# Patient Record
Sex: FEMALE | Race: WHITE | Employment: OTHER | URBAN - NONMETROPOLITAN AREA
[De-identification: names, ages, dates, MRNs, and addresses within clinical notes are randomized per-mention and may not be internally consistent; named-entity substitution may affect disease eponyms.]

---

## 2019-04-10 LAB
ALBUMIN SERPL-MCNC: NORMAL G/DL
ALP BLD-CCNC: NORMAL U/L
ALT SERPL-CCNC: NORMAL U/L
ANION GAP SERPL CALCULATED.3IONS-SCNC: NORMAL MMOL/L
AST SERPL-CCNC: NORMAL U/L
BILIRUB SERPL-MCNC: NORMAL MG/DL (ref 0.1–1.4)
BUN BLDV-MCNC: NORMAL MG/DL
CALCIUM SERPL-MCNC: NORMAL MG/DL
CHLORIDE BLD-SCNC: NORMAL MMOL/L
CO2: NORMAL MMOL/L
CREAT SERPL-MCNC: NORMAL MG/DL
GFR CALCULATED: NORMAL
GLUCOSE BLD-MCNC: NORMAL MG/DL
POTASSIUM SERPL-SCNC: NORMAL MMOL/L
SODIUM BLD-SCNC: NORMAL MMOL/L
TOTAL PROTEIN: NORMAL

## 2019-05-09 ENCOUNTER — OFFICE VISIT (OUTPATIENT)
Dept: FAMILY MEDICINE CLINIC | Age: 79
End: 2019-05-09
Payer: COMMERCIAL

## 2019-05-09 VITALS
DIASTOLIC BLOOD PRESSURE: 76 MMHG | BODY MASS INDEX: 21.58 KG/M2 | SYSTOLIC BLOOD PRESSURE: 132 MMHG | OXYGEN SATURATION: 93 % | HEIGHT: 64 IN | TEMPERATURE: 97.3 F | HEART RATE: 113 BPM | WEIGHT: 126.4 LBS

## 2019-05-09 DIAGNOSIS — J44.1 CHRONIC OBSTRUCTIVE PULMONARY DISEASE WITH ACUTE EXACERBATION (HCC): ICD-10-CM

## 2019-05-09 PROBLEM — J44.9 COPD (CHRONIC OBSTRUCTIVE PULMONARY DISEASE) (HCC): Status: ACTIVE | Noted: 2019-05-09

## 2019-05-09 PROCEDURE — 99213 OFFICE O/P EST LOW 20 MIN: CPT | Performed by: NURSE PRACTITIONER

## 2019-05-09 RX ORDER — PANTOPRAZOLE SODIUM 40 MG/1
40 TABLET, DELAYED RELEASE ORAL DAILY
COMMUNITY
End: 2019-07-22 | Stop reason: SDUPTHER

## 2019-05-09 RX ORDER — ALPRAZOLAM 0.25 MG/1
0.25 TABLET ORAL DAILY
COMMUNITY
End: 2019-07-22 | Stop reason: SDUPTHER

## 2019-05-09 RX ORDER — IPRATROPIUM BROMIDE AND ALBUTEROL SULFATE 2.5; .5 MG/3ML; MG/3ML
1 SOLUTION RESPIRATORY (INHALATION) EVERY 6 HOURS PRN
COMMUNITY
End: 2019-11-25 | Stop reason: SDUPTHER

## 2019-05-09 RX ORDER — ALBUTEROL SULFATE 90 UG/1
2 AEROSOL, METERED RESPIRATORY (INHALATION)
COMMUNITY
End: 2019-06-25 | Stop reason: SDUPTHER

## 2019-05-09 RX ORDER — METOPROLOL SUCCINATE 50 MG/1
50 TABLET, EXTENDED RELEASE ORAL DAILY
COMMUNITY
End: 2019-07-22 | Stop reason: SDUPTHER

## 2019-05-09 NOTE — PROGRESS NOTES
2019     Celina Ríos (:  1940) is a 77 y.o. female, here for evaluation of the following medical concerns:  Chief Complaint   Patient presents with    Shortness of Breath     weak, shakey        HPI:  77 y.o. female presents with severe SOB. Pulse ox on 3L was 75 with movement. Never above 90 sitting still and not talking. HR is 120. Daughter mentions she is also anemic. Was in hospital a month ago. Has not had f/u with pulmonary. Heart to heart with patient and daughter. To proceed to ED     No past medical history on file. Current Outpatient Medications on File Prior to Visit   Medication Sig Dispense Refill    traMADol (ULTRAM) 50 MG tablet Take 50 mg by mouth 3 times daily as needed. No current facility-administered medications on file prior to visit. Allergies   Allergen Reactions    Penicillins     Venlafaxine        No family history on file. Past Surgical History:   Procedure Laterality Date    KNEE SURGERY         Social History     Tobacco Use    Smoking status: Former Smoker     Packs/day: 0.50     Years: 60.00     Pack years: 30.00     Types: Cigarettes     Last attempt to quit: 12/10/2018     Years since quittin.4    Smokeless tobacco: Never Used   Substance Use Topics    Alcohol use: No    Drug use: Not on file       ROS:      Review of Systems            Vitals:    19 0936   BP: 132/76   Pulse: 113   Temp: 97.3 °F (36.3 °C)   SpO2: 93%  Comment: 3lpm nc   Weight: 126 lb 6.4 oz (57.3 kg)   Height: 5' 3.5\" (1.613 m)     Estimated body mass index is 22.04 kg/m² as calculated from the following:    Height as of this encounter: 5' 3.5\" (1.613 m). Weight as of this encounter: 126 lb 6.4 oz (57.3 kg). PHYSICAL EXAM:    VS:  Blood pressure 132/76, pulse 113, temperature 97.3 °F (36.3 °C), height 5' 3.5\" (1.613 m), weight 126 lb 6.4 oz (57.3 kg), SpO2 93 %. Physical Exam   Constitutional:   Chronically ill and frail.  O2 NC   Cardiovascular: Tachycardia present. Pulmonary/Chest: Accessory muscle usage present. Tachypnea noted. She is in respiratory distress. She has decreased breath sounds. She has rales. ASSESSMENT/PLAN:    Cherry Seays was seen today for shortness of breath. Diagnoses and all orders for this visit:    Chronic obstructive pulmonary disease with acute exacerbation ([facility] [address]         No orders of the defined types were placed in this encounter. No follow-ups on file. An electronic signature was used to authenticate this note.     --MIKE Huffman - CNP on 5/9/2019 at 10:35 AM

## 2019-06-25 DIAGNOSIS — M15.9 PRIMARY OSTEOARTHRITIS INVOLVING MULTIPLE JOINTS: ICD-10-CM

## 2019-06-25 DIAGNOSIS — J43.2 CENTRILOBULAR EMPHYSEMA (HCC): Primary | ICD-10-CM

## 2019-06-25 NOTE — TELEPHONE ENCOUNTER
She is asking for a refill on albuterol and tramadol.   Please send to [facility] in [place]

## 2019-06-27 RX ORDER — TRAMADOL HYDROCHLORIDE 50 MG/1
50 TABLET ORAL 3 TIMES DAILY PRN
Qty: 90 TABLET | Refills: 0 | Status: SHIPPED | OUTPATIENT
Start: 2019-06-27 | End: 2019-07-22 | Stop reason: SDUPTHER

## 2019-06-27 RX ORDER — ALBUTEROL SULFATE 90 UG/1
2 AEROSOL, METERED RESPIRATORY (INHALATION)
Qty: 1 INHALER | Refills: 5 | Status: SHIPPED | OUTPATIENT
Start: 2019-06-27 | End: 2019-07-22 | Stop reason: SDUPTHER

## 2019-07-04 RX ORDER — ESTRADIOL 0.05 MG/D
1 FILM, EXTENDED RELEASE TRANSDERMAL SEE ADMIN INSTRUCTIONS
Qty: 4 PATCH | Refills: 0 | Status: SHIPPED | OUTPATIENT
Start: 2019-07-04 | End: 2019-07-22 | Stop reason: SDUPTHER

## 2019-07-21 ENCOUNTER — CLINICAL DOCUMENTATION (OUTPATIENT)
Dept: PRIMARY CARE CLINIC | Age: 79
End: 2019-07-21

## 2019-07-22 ENCOUNTER — OFFICE VISIT (OUTPATIENT)
Dept: PRIMARY CARE CLINIC | Age: 79
End: 2019-07-22
Payer: COMMERCIAL

## 2019-07-22 VITALS
HEIGHT: 63 IN | BODY MASS INDEX: 23.04 KG/M2 | HEART RATE: 82 BPM | DIASTOLIC BLOOD PRESSURE: 70 MMHG | SYSTOLIC BLOOD PRESSURE: 118 MMHG | WEIGHT: 130 LBS | OXYGEN SATURATION: 91 %

## 2019-07-22 DIAGNOSIS — F41.9 ANXIETY: ICD-10-CM

## 2019-07-22 DIAGNOSIS — J43.2 CENTRILOBULAR EMPHYSEMA (HCC): Primary | ICD-10-CM

## 2019-07-22 DIAGNOSIS — I10 ESSENTIAL HYPERTENSION: ICD-10-CM

## 2019-07-22 DIAGNOSIS — K21.9 GASTROESOPHAGEAL REFLUX DISEASE WITHOUT ESOPHAGITIS: ICD-10-CM

## 2019-07-22 DIAGNOSIS — Z78.0 MENOPAUSE: ICD-10-CM

## 2019-07-22 DIAGNOSIS — M15.9 PRIMARY OSTEOARTHRITIS INVOLVING MULTIPLE JOINTS: ICD-10-CM

## 2019-07-22 PROCEDURE — 99214 OFFICE O/P EST MOD 30 MIN: CPT | Performed by: INTERNAL MEDICINE

## 2019-07-22 RX ORDER — PANTOPRAZOLE SODIUM 40 MG/1
40 TABLET, DELAYED RELEASE ORAL DAILY
Qty: 30 TABLET | Refills: 2 | Status: SHIPPED | OUTPATIENT
Start: 2019-07-22 | End: 2019-11-25 | Stop reason: CLARIF

## 2019-07-22 RX ORDER — METOPROLOL SUCCINATE 50 MG/1
50 TABLET, EXTENDED RELEASE ORAL DAILY
Qty: 30 TABLET | Refills: 2 | Status: SHIPPED | OUTPATIENT
Start: 2019-07-22 | End: 2019-10-19 | Stop reason: SDUPTHER

## 2019-07-22 RX ORDER — ALBUTEROL SULFATE 90 UG/1
2 AEROSOL, METERED RESPIRATORY (INHALATION)
Qty: 1 INHALER | Refills: 5 | Status: SHIPPED | OUTPATIENT
Start: 2019-07-22 | End: 2019-11-25 | Stop reason: SDUPTHER

## 2019-07-22 RX ORDER — ESTRADIOL 0.05 MG/D
1 FILM, EXTENDED RELEASE TRANSDERMAL SEE ADMIN INSTRUCTIONS
Qty: 4 PATCH | Refills: 2 | Status: SHIPPED | OUTPATIENT
Start: 2019-07-22 | End: 2019-11-25 | Stop reason: SDUPTHER

## 2019-07-22 RX ORDER — ALPRAZOLAM 0.25 MG/1
0.25 TABLET ORAL DAILY
Qty: 30 TABLET | Refills: 2 | Status: SHIPPED | OUTPATIENT
Start: 2019-07-22 | End: 2019-10-20

## 2019-07-22 RX ORDER — TRAMADOL HYDROCHLORIDE 50 MG/1
50 TABLET ORAL 3 TIMES DAILY PRN
Qty: 90 TABLET | Refills: 2 | Status: SHIPPED | OUTPATIENT
Start: 2019-07-22 | End: 2019-07-24 | Stop reason: SDUPTHER

## 2019-07-24 DIAGNOSIS — M15.9 PRIMARY OSTEOARTHRITIS INVOLVING MULTIPLE JOINTS: ICD-10-CM

## 2019-07-24 RX ORDER — TRAMADOL HYDROCHLORIDE 50 MG/1
50 TABLET ORAL 3 TIMES DAILY PRN
Qty: 90 TABLET | Refills: 2 | Status: SHIPPED | OUTPATIENT
Start: 2019-07-24 | End: 2019-10-22

## 2019-07-24 NOTE — TELEPHONE ENCOUNTER
Pt called in and stated that med never got sent to pharm but it looks like it was sent. I called [facility] and they never received it.

## 2019-08-28 ENCOUNTER — TELEPHONE (OUTPATIENT)
Dept: PRIMARY CARE CLINIC | Age: 79
End: 2019-08-28

## 2019-08-28 DIAGNOSIS — E61.1 IRON DEFICIENCY: ICD-10-CM

## 2019-08-28 DIAGNOSIS — J43.2 CENTRILOBULAR EMPHYSEMA (HCC): Primary | ICD-10-CM

## 2019-10-19 DIAGNOSIS — I10 ESSENTIAL HYPERTENSION: ICD-10-CM

## 2019-10-21 RX ORDER — METOPROLOL SUCCINATE 50 MG/1
TABLET, EXTENDED RELEASE ORAL
Qty: 90 TABLET | Refills: 0 | Status: SHIPPED | OUTPATIENT
Start: 2019-10-21 | End: 2019-11-25 | Stop reason: SDUPTHER

## 2019-11-01 DIAGNOSIS — M15.9 PRIMARY OSTEOARTHRITIS INVOLVING MULTIPLE JOINTS: ICD-10-CM

## 2019-11-01 RX ORDER — TRAMADOL HYDROCHLORIDE 50 MG/1
50 TABLET ORAL 3 TIMES DAILY PRN
Qty: 90 TABLET | Refills: 0 | Status: SHIPPED | OUTPATIENT
Start: 2019-11-01 | End: 2019-11-25 | Stop reason: SDUPTHER

## 2019-11-05 RX ORDER — ALPRAZOLAM 0.25 MG/1
0.25 TABLET ORAL NIGHTLY PRN
COMMUNITY
End: 2019-11-25 | Stop reason: SDUPTHER

## 2019-11-25 ENCOUNTER — OFFICE VISIT (OUTPATIENT)
Dept: PRIMARY CARE CLINIC | Age: 79
End: 2019-11-25
Payer: COMMERCIAL

## 2019-11-25 ENCOUNTER — HOSPITAL ENCOUNTER (OUTPATIENT)
Age: 79
Discharge: HOME | End: 2019-11-27
Payer: COMMERCIAL

## 2019-11-25 VITALS
SYSTOLIC BLOOD PRESSURE: 118 MMHG | DIASTOLIC BLOOD PRESSURE: 68 MMHG | BODY MASS INDEX: 24.27 KG/M2 | WEIGHT: 137 LBS | OXYGEN SATURATION: 97 % | HEART RATE: 69 BPM

## 2019-11-25 DIAGNOSIS — D64.9 ANEMIA, UNSPECIFIED TYPE: Primary | ICD-10-CM

## 2019-11-25 DIAGNOSIS — J43.2 CENTRILOBULAR EMPHYSEMA (HCC): ICD-10-CM

## 2019-11-25 DIAGNOSIS — M70.61 GREATER TROCHANTERIC BURSITIS OF BOTH HIPS: ICD-10-CM

## 2019-11-25 DIAGNOSIS — Z78.0 MENOPAUSE: ICD-10-CM

## 2019-11-25 DIAGNOSIS — I10 ESSENTIAL HYPERTENSION: ICD-10-CM

## 2019-11-25 DIAGNOSIS — M70.62 GREATER TROCHANTERIC BURSITIS OF BOTH HIPS: ICD-10-CM

## 2019-11-25 DIAGNOSIS — M15.9 PRIMARY OSTEOARTHRITIS INVOLVING MULTIPLE JOINTS: ICD-10-CM

## 2019-11-25 DIAGNOSIS — D64.9 ANEMIA, UNSPECIFIED TYPE: ICD-10-CM

## 2019-11-25 LAB
ALBUMIN SERPL-MCNC: 4.4 G/DL (ref 3.5–5.2)
ALP BLD-CCNC: 95 U/L (ref 35–104)
ALT SERPL-CCNC: 17 U/L (ref 0–32)
ANION GAP SERPL CALCULATED.3IONS-SCNC: 14 MMOL/L (ref 7–16)
ANISOCYTOSIS: ABNORMAL
AST SERPL-CCNC: 22 U/L (ref 0–31)
BASOPHILS ABSOLUTE: 0.08 E9/L (ref 0–0.2)
BASOPHILS RELATIVE PERCENT: 0.9 % (ref 0–2)
BILIRUB SERPL-MCNC: 0.2 MG/DL (ref 0–1.2)
BUN BLDV-MCNC: 21 MG/DL (ref 8–23)
CALCIUM SERPL-MCNC: 9.9 MG/DL (ref 8.6–10.2)
CHLORIDE BLD-SCNC: 98 MMOL/L (ref 98–107)
CO2: 26 MMOL/L (ref 22–29)
CREAT SERPL-MCNC: 0.7 MG/DL (ref 0.5–1)
EOSINOPHILS ABSOLUTE: 0.24 E9/L (ref 0.05–0.5)
EOSINOPHILS RELATIVE PERCENT: 2.8 % (ref 0–6)
FERRITIN: 17 NG/ML
GFR AFRICAN AMERICAN: >60
GFR NON-AFRICAN AMERICAN: >60 ML/MIN/1.73
GLUCOSE BLD-MCNC: 82 MG/DL (ref 74–99)
HCT VFR BLD CALC: 36.9 % (ref 34–48)
HEMOGLOBIN: 10.6 G/DL (ref 11.5–15.5)
IMMATURE GRANULOCYTES #: 0.03 E9/L
IMMATURE GRANULOCYTES %: 0.3 % (ref 0–5)
IRON SATURATION: 7 % (ref 15–50)
IRON: 31 MCG/DL (ref 37–145)
LYMPHOCYTES ABSOLUTE: 2.4 E9/L (ref 1.5–4)
LYMPHOCYTES RELATIVE PERCENT: 27.8 % (ref 20–42)
MAGNESIUM: 2 MG/DL (ref 1.6–2.6)
MCH RBC QN AUTO: 23.2 PG (ref 26–35)
MCHC RBC AUTO-ENTMCNC: 28.7 % (ref 32–34.5)
MCV RBC AUTO: 80.9 FL (ref 80–99.9)
MONOCYTES ABSOLUTE: 0.94 E9/L (ref 0.1–0.95)
MONOCYTES RELATIVE PERCENT: 10.9 % (ref 2–12)
NEUTROPHILS ABSOLUTE: 4.95 E9/L (ref 1.8–7.3)
NEUTROPHILS RELATIVE PERCENT: 57.3 % (ref 43–80)
OVALOCYTES: ABNORMAL
PDW BLD-RTO: 18.6 FL (ref 11.5–15)
PLATELET # BLD: 323 E9/L (ref 130–450)
PMV BLD AUTO: 9.7 FL (ref 7–12)
POIKILOCYTES: ABNORMAL
POTASSIUM SERPL-SCNC: 4.5 MMOL/L (ref 3.5–5)
RBC # BLD: 4.56 E12/L (ref 3.5–5.5)
SODIUM BLD-SCNC: 138 MMOL/L (ref 132–146)
TOTAL IRON BINDING CAPACITY: 461 MCG/DL (ref 250–450)
TOTAL PROTEIN: 7.8 G/DL (ref 6.4–8.3)
WBC # BLD: 8.6 E9/L (ref 4.5–11.5)

## 2019-11-25 PROCEDURE — 83540 ASSAY OF IRON: CPT

## 2019-11-25 PROCEDURE — 99214 OFFICE O/P EST MOD 30 MIN: CPT | Performed by: INTERNAL MEDICINE

## 2019-11-25 PROCEDURE — 83735 ASSAY OF MAGNESIUM: CPT

## 2019-11-25 PROCEDURE — 80053 COMPREHEN METABOLIC PANEL: CPT

## 2019-11-25 PROCEDURE — 85025 COMPLETE CBC W/AUTO DIFF WBC: CPT

## 2019-11-25 PROCEDURE — 36415 COLL VENOUS BLD VENIPUNCTURE: CPT

## 2019-11-25 PROCEDURE — 83550 IRON BINDING TEST: CPT

## 2019-11-25 PROCEDURE — 82728 ASSAY OF FERRITIN: CPT

## 2019-11-25 RX ORDER — METOPROLOL SUCCINATE 50 MG/1
TABLET, EXTENDED RELEASE ORAL
Qty: 90 TABLET | Refills: 1 | Status: SHIPPED
Start: 2019-11-25 | End: 2020-03-16 | Stop reason: SDUPTHER

## 2019-11-25 RX ORDER — ESTRADIOL 0.05 MG/D
1 FILM, EXTENDED RELEASE TRANSDERMAL SEE ADMIN INSTRUCTIONS
Qty: 4 PATCH | Refills: 2 | Status: SHIPPED
Start: 2019-11-25 | End: 2020-03-16 | Stop reason: SDUPTHER

## 2019-11-25 RX ORDER — ALPRAZOLAM 0.25 MG/1
0.25 TABLET ORAL NIGHTLY PRN
Qty: 30 TABLET | Refills: 2 | Status: SHIPPED | OUTPATIENT
Start: 2019-11-25 | End: 2020-02-23

## 2019-11-25 RX ORDER — TRAMADOL HYDROCHLORIDE 50 MG/1
50 TABLET ORAL 3 TIMES DAILY PRN
Qty: 90 TABLET | Refills: 1 | Status: SHIPPED | OUTPATIENT
Start: 2019-11-25 | End: 2020-01-30 | Stop reason: SDUPTHER

## 2019-11-25 RX ORDER — IPRATROPIUM BROMIDE AND ALBUTEROL SULFATE 2.5; .5 MG/3ML; MG/3ML
1 SOLUTION RESPIRATORY (INHALATION) EVERY 6 HOURS PRN
Qty: 360 ML | Refills: 5 | Status: SHIPPED
Start: 2019-11-25 | End: 2020-03-16 | Stop reason: SDUPTHER

## 2019-11-25 RX ORDER — ALBUTEROL SULFATE 90 UG/1
2 AEROSOL, METERED RESPIRATORY (INHALATION)
Qty: 1 INHALER | Refills: 5 | Status: SHIPPED
Start: 2019-11-25 | End: 2020-03-16 | Stop reason: SDUPTHER

## 2019-11-26 DIAGNOSIS — D50.9 IRON DEFICIENCY ANEMIA, UNSPECIFIED IRON DEFICIENCY ANEMIA TYPE: Primary | ICD-10-CM

## 2019-12-07 ENCOUNTER — OFFICE VISIT (OUTPATIENT)
Dept: FAMILY MEDICINE CLINIC | Age: 79
End: 2019-12-07
Payer: COMMERCIAL

## 2019-12-07 VITALS
DIASTOLIC BLOOD PRESSURE: 62 MMHG | SYSTOLIC BLOOD PRESSURE: 124 MMHG | TEMPERATURE: 98 F | OXYGEN SATURATION: 90 % | HEART RATE: 116 BPM

## 2019-12-07 DIAGNOSIS — J44.1 CHRONIC OBSTRUCTIVE PULMONARY DISEASE WITH ACUTE EXACERBATION (HCC): Primary | ICD-10-CM

## 2019-12-07 DIAGNOSIS — R06.2 WHEEZE: ICD-10-CM

## 2019-12-07 DIAGNOSIS — J18.9 PNEUMONIA DUE TO INFECTIOUS ORGANISM, UNSPECIFIED LATERALITY, UNSPECIFIED PART OF LUNG: ICD-10-CM

## 2019-12-07 DIAGNOSIS — R06.02 SOB (SHORTNESS OF BREATH): ICD-10-CM

## 2019-12-07 PROCEDURE — 99214 OFFICE O/P EST MOD 30 MIN: CPT | Performed by: INTERNAL MEDICINE

## 2019-12-07 PROCEDURE — S0077 INJECTION, CLINDAMYCIN PHOSP: HCPCS | Performed by: INTERNAL MEDICINE

## 2019-12-07 RX ORDER — PREDNISONE 10 MG/1
TABLET ORAL
Qty: 18 TABLET | Refills: 0 | Status: SHIPPED | OUTPATIENT
Start: 2019-12-07 | End: 2019-12-16

## 2019-12-07 RX ORDER — DOXYCYCLINE HYCLATE 100 MG
100 TABLET ORAL 2 TIMES DAILY
Qty: 20 TABLET | Refills: 0 | Status: SHIPPED | OUTPATIENT
Start: 2019-12-07 | End: 2019-12-17

## 2019-12-07 RX ORDER — CLINDAMYCIN PHOSPHATE 150 MG/ML
300 INJECTION, SOLUTION INTRAVENOUS ONCE
Status: COMPLETED | OUTPATIENT
Start: 2019-12-07 | End: 2019-12-07

## 2019-12-07 RX ADMIN — CLINDAMYCIN PHOSPHATE 300 MG: 150 INJECTION, SOLUTION INTRAVENOUS at 12:37

## 2019-12-07 ASSESSMENT — ENCOUNTER SYMPTOMS
SINUS PAIN: 0
EYES NEGATIVE: 1
NAUSEA: 0
ABDOMINAL PAIN: 0
SHORTNESS OF BREATH: 1
COUGH: 1
WHEEZING: 1
CHEST TIGHTNESS: 0
SORE THROAT: 0
SINUS PRESSURE: 0

## 2020-01-13 ENCOUNTER — OFFICE VISIT (OUTPATIENT)
Dept: PRIMARY CARE CLINIC | Age: 80
End: 2020-01-13
Payer: COMMERCIAL

## 2020-01-13 VITALS
HEART RATE: 76 BPM | HEIGHT: 64 IN | SYSTOLIC BLOOD PRESSURE: 134 MMHG | TEMPERATURE: 97.3 F | DIASTOLIC BLOOD PRESSURE: 68 MMHG | WEIGHT: 136 LBS | OXYGEN SATURATION: 92 % | BODY MASS INDEX: 23.22 KG/M2

## 2020-01-13 PROCEDURE — 99214 OFFICE O/P EST MOD 30 MIN: CPT | Performed by: NURSE PRACTITIONER

## 2020-01-13 PROCEDURE — 1111F DSCHRG MED/CURRENT MED MERGE: CPT | Performed by: NURSE PRACTITIONER

## 2020-01-13 ASSESSMENT — PATIENT HEALTH QUESTIONNAIRE - PHQ9
SUM OF ALL RESPONSES TO PHQ QUESTIONS 1-9: 0
2. FEELING DOWN, DEPRESSED OR HOPELESS: 0
1. LITTLE INTEREST OR PLEASURE IN DOING THINGS: 0
SUM OF ALL RESPONSES TO PHQ QUESTIONS 1-9: 0
SUM OF ALL RESPONSES TO PHQ9 QUESTIONS 1 & 2: 0

## 2020-01-13 NOTE — PROGRESS NOTES
CC: The patient presents to follow-up from hospitalization. HPI: The patient was hospitalized with hemoptysis and diagnosed with right-sided pneumonia. During hospitalization, she was noted to be hypoxic and required 4 L of oxygen. She is now currently on 3 L, saturation 92-93 here in the office. She states she does not monitor this at home. On imaging of the chest, she did have a 2.5 cm right hilar node that was increased from imaging completed in early December. She is following with pulmonology, and will have repeat imaging next month. The patient did have bronchoscopy completed while she was in the hospital, no mass was visualized. Clinically, the patient is improved. Prior to hospitalization, the patient was recommended to see hematology for IV iron infusions. She has been trying to take oral iron but has not tolerated this. I did discuss with her the correlation between anemia and oxygen saturations, and she states she is willing to make the phone call and get into see hematology. ROS:  Const: General health stated as fair. Eyes: Denies eye symptoms. CV: Reports hypertension. Resp: Reports COPD, chronic hypoxemia. Recent pneumonia. Right hilar mass. : Severe hot flushing Denies urinary problems. Musculo: Reports arthralgias and arthritis. Lateral greater trochanteric bursitis  Breast: Denies breast problems. Endocrine: Denies polydipsia, polyphagia and polyuria. Current Outpatient Medications   Medication Sig Dispense Refill    albuterol sulfate HFA (VENTOLIN HFA) 108 (90 Base) MCG/ACT inhaler Inhale 2 puffs into the lungs every 4-6 hours as needed for Wheezing or Shortness of Breath 1 Inhaler 5    estradiol (VIVELLE) 0.05 MG/24HR Place 1 patch onto the skin See Admin Instructions Apply transderm patch once weekly.  Dispense 4 patches for 28days 4 patch 2    fluticasone-salmeterol (ADVAIR DISKUS) 250-50 MCG/DOSE AEPB Inhale 1 puff into the lungs 2 times daily 1 each 5    metoprolol succinate (TOPROL XL) 50 MG extended release tablet take 1 tablet by mouth once daily 90 tablet 1    traMADol (ULTRAM) 50 MG tablet Take 1 tablet by mouth 3 times daily as needed for Pain (as needed) for up to 90 days. 90 tablet 1    ALPRAZolam (XANAX) 0.25 MG tablet Take 1 tablet by mouth nightly as needed for Sleep for up to 90 days. 30 tablet 2    ipratropium-albuterol (DUONEB) 0.5-2.5 (3) MG/3ML SOLN nebulizer solution Inhale 3 mLs into the lungs every 6 hours as needed for Shortness of Breath 360 mL 5    fluticasone-salmeterol (ADVAIR DISKUS) 250-50 MCG/DOSE AEPB Inhale 1 puff into the lungs 2 times daily       No current facility-administered medications for this visit. PMH:  Shot Record:  Methp 40-Methylprednisolone Acetate 40 MG 19  [id number]-Ltpgvwz 80MG NDC [address]-80 11  -Zjnhux (Albuterol & Ipratropium) 19  -Zasz Medrol To 125 MG Injection 19  -Bdzs Medrol 20MG Injection 13  90732-Pneumococcal Vaccine (Pneumovax) 11  90688-Influenza Vaccine- Fluzone Iiv4 Quadrivalent Vial, Ages 3 Yrs + 17  90658-Influenza Vaccine Fluvirin Iiv3 (ages 3 and older) 09/28/15 09/07/11. Health Maintenance:  Mammogram - (2015)  [facility]  1940 Page #2  Bone Density Test Screening - (2009)  Mammogram Screening - (2015)  Colonoscopy - (2017)  Colonoscopy Screening - (2017)  Colonoscopy -  [address],  DIVERTICULAR DX-  - NEXT   PFT'S - ,  MODERATE TO SEVERE COPD  2016-MOD/SEVERE COPD  2D ECHO -   Influenza Vaccination - ()  Pneumonia Vaccination - ()  Prevnar Vaccine - ()  Mammogram Screening - (11/15/2017) slip given  She has had pneumonia in the past.  Also, a questionable pulmonary emboli back in , with no recurrence, of an unidentified source.   HTN- GOOD RESPONSE TO TOPROL XL  [id number]-7/17/18- DID IMPROVE WITH BRONCODILATOR-REFUSES O2 THERAPY  CHRONIC TOBACCO ABUSE-COUNSELING GIVEN EVERY VISIT  BREAST CYST- NEED REPEAT US IN SPRING 2014  PNEUMONIA 12.2019 - 2.5cm right hilar node  Surgical Hx:  Total Hysterectomy  Knee Replacement - (3/2016)The patient has had a [facility]  Reviewed, no changes. FH:  Noncontributory  Reviewed, no changes. SH: Patient is . Personal Habits: Current cigarette smoker, has smoked 1/2 pack daily. NOW VAPOR Drinks alcohol occasionally. Does not exercise. Reviewed, no changes. Date: 04/22/2019  Was the patient queried about smoking behavior? Yes No  Does the patient currently smoke? Smoking: Patient is a current smoker, smokes every day - (11/9/2015) VAPOR. Objective  Vitals:    01/13/20 1024 01/13/20 1054   BP: 134/68    Site: Left Upper Arm    Position: Sitting    Cuff Size: Medium Adult    Pulse: 76    Temp: 97.3 °F (36.3 °C)    TempSrc: Temporal    SpO2: 90% 92%   Weight: 136 lb (61.7 kg)    Height: 5' 3.5\" (1.613 m)        Exam:  Const: Appears healthy,well developed and well nourished. Appears frail. Eyes: EOMI in both eyes. PERRL. ENMT: External canals are clear and dry. Tympanic membranes are intact. External nose WNL. Moistness and  normal color of the nasal mucosae. Septum is in the midline. Dentition is in good repair. Gums appear healthy. Posterior pharynx shows no exudate, irritation or redness. Neck: Supple and symmetric. Palpation reveals no adenopathy. No masses appreciated. Thyroid exhibits no nodules  or thyromegaly. No JVD. Resp: Respirations are unlabored. Respiration rate is normal. Auscultate markedly decreased airflow. No rales or  wheezes appreciated over the lungs bilaterally. CV: Rhythm is regular. S1 is normal. S2 is normal. No heart murmur appreciated. Extremities: No clubbing, cyanosis or edema. Musculo: Walks with a normal gait for age.  Upper Extremities: Crepitation and limitation with

## 2020-01-30 RX ORDER — TRAMADOL HYDROCHLORIDE 50 MG/1
50 TABLET ORAL 3 TIMES DAILY PRN
Qty: 90 TABLET | Refills: 0 | Status: SHIPPED
Start: 2020-01-30 | End: 2020-03-02 | Stop reason: SDUPTHER

## 2020-03-03 RX ORDER — TRAMADOL HYDROCHLORIDE 50 MG/1
50 TABLET ORAL 3 TIMES DAILY PRN
Qty: 90 TABLET | Refills: 0 | Status: SHIPPED
Start: 2020-03-03 | End: 2020-03-16 | Stop reason: SDUPTHER

## 2020-03-16 ENCOUNTER — HOSPITAL ENCOUNTER (OUTPATIENT)
Age: 80
Discharge: HOME | End: 2020-03-18
Payer: MEDICARE

## 2020-03-16 ENCOUNTER — OFFICE VISIT (OUTPATIENT)
Dept: PRIMARY CARE CLINIC | Age: 80
End: 2020-03-16
Payer: MEDICARE

## 2020-03-16 VITALS — SYSTOLIC BLOOD PRESSURE: 126 MMHG | HEART RATE: 98 BPM | DIASTOLIC BLOOD PRESSURE: 72 MMHG | OXYGEN SATURATION: 91 %

## 2020-03-16 PROBLEM — F51.01 PRIMARY INSOMNIA: Status: ACTIVE | Noted: 2020-03-16

## 2020-03-16 LAB
ALBUMIN SERPL-MCNC: 4.4 G/DL (ref 3.5–5.2)
ALP BLD-CCNC: 100 U/L (ref 35–104)
ALT SERPL-CCNC: 17 U/L (ref 0–32)
ANION GAP SERPL CALCULATED.3IONS-SCNC: 14 MMOL/L (ref 7–16)
AST SERPL-CCNC: 22 U/L (ref 0–31)
BASOPHILS ABSOLUTE: 0.06 E9/L (ref 0–0.2)
BASOPHILS RELATIVE PERCENT: 0.7 % (ref 0–2)
BILIRUB SERPL-MCNC: 0.3 MG/DL (ref 0–1.2)
BUN BLDV-MCNC: 14 MG/DL (ref 8–23)
CALCIUM SERPL-MCNC: 10 MG/DL (ref 8.6–10.2)
CHLORIDE BLD-SCNC: 97 MMOL/L (ref 98–107)
CO2: 28 MMOL/L (ref 22–29)
CREAT SERPL-MCNC: 0.6 MG/DL (ref 0.5–1)
EOSINOPHILS ABSOLUTE: 0.32 E9/L (ref 0.05–0.5)
EOSINOPHILS RELATIVE PERCENT: 3.8 % (ref 0–6)
FERRITIN: 58 NG/ML
GFR AFRICAN AMERICAN: >60
GFR NON-AFRICAN AMERICAN: >60 ML/MIN/1.73
GLUCOSE BLD-MCNC: 88 MG/DL (ref 74–99)
HCT VFR BLD CALC: 46.1 % (ref 34–48)
HEMOGLOBIN: 14 G/DL (ref 11.5–15.5)
IMMATURE GRANULOCYTES #: 0.03 E9/L
IMMATURE GRANULOCYTES %: 0.4 % (ref 0–5)
IRON SATURATION: 30 % (ref 15–50)
IRON: 107 MCG/DL (ref 37–145)
LYMPHOCYTES ABSOLUTE: 2.63 E9/L (ref 1.5–4)
LYMPHOCYTES RELATIVE PERCENT: 30.8 % (ref 20–42)
MAGNESIUM: 2 MG/DL (ref 1.6–2.6)
MCH RBC QN AUTO: 27.6 PG (ref 26–35)
MCHC RBC AUTO-ENTMCNC: 30.4 % (ref 32–34.5)
MCV RBC AUTO: 90.9 FL (ref 80–99.9)
MONOCYTES ABSOLUTE: 0.76 E9/L (ref 0.1–0.95)
MONOCYTES RELATIVE PERCENT: 8.9 % (ref 2–12)
NEUTROPHILS ABSOLUTE: 4.73 E9/L (ref 1.8–7.3)
NEUTROPHILS RELATIVE PERCENT: 55.4 % (ref 43–80)
PDW BLD-RTO: 17.5 FL (ref 11.5–15)
PLATELET # BLD: 318 E9/L (ref 130–450)
PMV BLD AUTO: 10.1 FL (ref 7–12)
POTASSIUM SERPL-SCNC: 4.6 MMOL/L (ref 3.5–5)
RBC # BLD: 5.07 E12/L (ref 3.5–5.5)
SODIUM BLD-SCNC: 139 MMOL/L (ref 132–146)
TOTAL IRON BINDING CAPACITY: 358 MCG/DL (ref 250–450)
TOTAL PROTEIN: 7.9 G/DL (ref 6.4–8.3)
WBC # BLD: 8.5 E9/L (ref 4.5–11.5)

## 2020-03-16 PROCEDURE — 83540 ASSAY OF IRON: CPT

## 2020-03-16 PROCEDURE — 99214 OFFICE O/P EST MOD 30 MIN: CPT | Performed by: INTERNAL MEDICINE

## 2020-03-16 PROCEDURE — 83735 ASSAY OF MAGNESIUM: CPT

## 2020-03-16 PROCEDURE — 85025 COMPLETE CBC W/AUTO DIFF WBC: CPT

## 2020-03-16 PROCEDURE — 36415 COLL VENOUS BLD VENIPUNCTURE: CPT

## 2020-03-16 PROCEDURE — 82728 ASSAY OF FERRITIN: CPT

## 2020-03-16 PROCEDURE — 80053 COMPREHEN METABOLIC PANEL: CPT

## 2020-03-16 PROCEDURE — 83550 IRON BINDING TEST: CPT

## 2020-03-16 RX ORDER — ESTRADIOL 0.05 MG/D
1 FILM, EXTENDED RELEASE TRANSDERMAL SEE ADMIN INSTRUCTIONS
Qty: 4 PATCH | Refills: 2 | Status: SHIPPED
Start: 2020-03-16 | End: 2020-07-13 | Stop reason: SDUPTHER

## 2020-03-16 RX ORDER — TRAMADOL HYDROCHLORIDE 50 MG/1
50 TABLET ORAL 3 TIMES DAILY PRN
Qty: 90 TABLET | Refills: 0 | Status: SHIPPED | OUTPATIENT
Start: 2020-03-16 | End: 2020-04-23 | Stop reason: SDUPTHER

## 2020-03-16 RX ORDER — ESTRADIOL 0.05 MG/D
PATCH TRANSDERMAL
COMMUNITY
Start: 2020-03-03 | End: 2020-07-13

## 2020-03-16 RX ORDER — ALBUTEROL SULFATE 90 UG/1
2 AEROSOL, METERED RESPIRATORY (INHALATION)
Qty: 1 INHALER | Refills: 5 | Status: SHIPPED
Start: 2020-03-16 | End: 2020-07-13 | Stop reason: SDUPTHER

## 2020-03-16 RX ORDER — FERROUS SULFATE 325(65) MG
TABLET ORAL
COMMUNITY
Start: 2019-12-17 | End: 2020-07-13

## 2020-03-16 RX ORDER — IPRATROPIUM BROMIDE AND ALBUTEROL SULFATE 2.5; .5 MG/3ML; MG/3ML
1 SOLUTION RESPIRATORY (INHALATION) EVERY 6 HOURS PRN
Qty: 360 ML | Refills: 5 | Status: SHIPPED
Start: 2020-03-16 | End: 2020-07-13 | Stop reason: SDUPTHER

## 2020-03-16 RX ORDER — PREDNISONE 20 MG/1
TABLET ORAL
COMMUNITY
Start: 2020-01-01 | End: 2020-07-13

## 2020-03-16 RX ORDER — METOPROLOL SUCCINATE 50 MG/1
TABLET, EXTENDED RELEASE ORAL
Qty: 90 TABLET | Refills: 1 | Status: SHIPPED
Start: 2020-03-16 | End: 2020-07-13 | Stop reason: SDUPTHER

## 2020-03-16 RX ORDER — ALPRAZOLAM 0.25 MG/1
TABLET ORAL
COMMUNITY
Start: 2020-03-11 | End: 2020-03-16 | Stop reason: SDUPTHER

## 2020-03-16 RX ORDER — ALPRAZOLAM 0.25 MG/1
TABLET ORAL
Qty: 30 TABLET | Refills: 3 | Status: SHIPPED | OUTPATIENT
Start: 2020-03-16 | End: 2020-04-16

## 2020-03-16 NOTE — PROGRESS NOTES
MG/24HR Place 1 patch onto the skin See Admin Instructions Apply transderm patch once weekly. Dispense 4 patches for 28days 4 patch 2    metoprolol succinate (TOPROL XL) 50 MG extended release tablet take 1 tablet by mouth once daily 90 tablet 1    ipratropium-albuterol (DUONEB) 0.5-2.5 (3) MG/3ML SOLN nebulizer solution Inhale 3 mLs into the lungs every 6 hours as needed for Shortness of Breath 360 mL 5    fluticasone-salmeterol (ADVAIR DISKUS) 250-50 MCG/DOSE AEPB Inhale 1 puff into the lungs 2 times daily       No current facility-administered medications for this visit. PMH:  Shot Record:  Methp 40-Methylprednisolone Acetate 40 MG 19  Kctl72-Oxdpuln 80MG NDC [address]-71 11  -Xsqrlf (Albuterol & Ipratropium) 19  -Fjga Medrol To 125 MG Injection 19  -Npxc Medrol 20MG Injection 13  90732-Pneumococcal Vaccine (Pneumovax) 11  90688-Influenza Vaccine- Fluzone Iiv4 Quadrivalent Vial, Ages 3 Yrs + 17  90658-Influenza Vaccine Fluvirin Iiv3 (ages 3 and older) 09/28/15 09/07/11. Health Maintenance:  Mammogram - (2015)  [address] [place] [address] 1940 Page #2  Bone Density Test Screening - (2009)  Mammogram Screening - (2015)  Colonoscopy - (2017)  Colonoscopy Screening - (2017)  Colonoscopy -  [address],  DIVERTICULAR DX-  - NEXT   PFT'S - ,  MODERATE TO SEVERE COPD  2016-MOD/SEVERE COPD  2D ECHO -   Influenza Vaccination - ()  Pneumonia Vaccination - ()  Prevnar Vaccine - ()  Mammogram Screening - (11/15/2017) slip given  She has had pneumonia in the past.  Also, a questionable pulmonary emboli back in , with no recurrence, of an unidentified source.   HTN- GOOD RESPONSE TO TOPROL XL  [id number]-6/17/18- DID IMPROVE WITH BRONCODILATOR-REFUSES O2 THERAPY  CHRONIC TOBACCO ABUSE-COUNSELING GIVEN EVERY VISIT  BREAST CYST- NEED REPEAT US IN [facility] 2014  PNEUMONIA 12.2019 - 2.5cm right hilar node  Surgical Hx:  Total Hysterectomy  Knee Replacement - (3/2016)The patient has had a previous total hysterectomy. colonoscopy [facility] 4/08 diverticular dx  Reviewed, no changes. FH:  Noncontributory  Reviewed, no changes. SH: Patient is . Personal Habits: Current cigarette smoker, has smoked 1/2 pack daily. NOW VAPOR Drinks alcohol occasionally. Does not exercise. Reviewed, no changes. Date: 04/22/2019  Was the patient queried about smoking behavior? Yes No  Does the patient currently smoke? Smoking: Patient is a current smoker, smokes every day - (11/9/2015) VAPOR. Objective  Vitals:    03/16/20 1128   BP: 126/72   Pulse: 98   SpO2: 91%       Exam:  Const: Appears healthy,well developed and well nourished. Appears frail. Eyes: EOMI in both eyes. PERRL. ENMT: External canals are clear and dry. Tympanic membranes are intact. External nose WNL. Moistness and  normal color of the nasal mucosae. Septum is in the midline. Dentition is in good repair. Gums appear healthy. Posterior pharynx shows no exudate, irritation or redness. Neck: Supple and symmetric. Palpation reveals no adenopathy. No masses appreciated. Thyroid exhibits no nodules  or thyromegaly. No JVD. Resp: Respirations are unlabored. Respiration rate is normal. Auscultate markedly decreased airflow. No rales or  wheezes appreciated over the lungs bilaterally. CV: Rhythm is regular. S1 is normal. S2 is normal. No heart murmur appreciated. Extremities: No clubbing, cyanosis or edema. Musculo: Walks with a normal gait for age. Upper Extremities: Crepitation and limitation with movement of the upper  extremities bilaterally. Lower Extremities: Crepitation and limitation of the lower extremities. Skin: Skin is warm and dry. Neuro: Alert and oriented x3. Mood is normal. Affect is normal. Speech is articulate and fluent. Reflexes: DTR's are  intact, symmetric and 2+ bilaterally.   Psych: solution; Inhale 3 mLs into the lungs every 6 hours as needed for Shortness of Breath    Lab work will be obtained today. Because the patient is elderly and has severe COPD I have advised her to be extremely careful about contact with other individuals.

## 2020-04-23 ENCOUNTER — TELEPHONE (OUTPATIENT)
Dept: ADMINISTRATIVE | Age: 80
End: 2020-04-23

## 2020-04-23 RX ORDER — TRAMADOL HYDROCHLORIDE 50 MG/1
50 TABLET ORAL 3 TIMES DAILY PRN
Qty: 90 TABLET | Refills: 3 | Status: SHIPPED | OUTPATIENT
Start: 2020-04-23 | End: 2020-05-04 | Stop reason: SDUPTHER

## 2020-04-23 NOTE — TELEPHONE ENCOUNTER
Needs refill on Tramadol [address], please call her and advise refill sent. Has appt July.

## 2020-05-04 RX ORDER — TRAMADOL HYDROCHLORIDE 50 MG/1
50 TABLET ORAL 3 TIMES DAILY PRN
Qty: 90 TABLET | Refills: 3 | OUTPATIENT
Start: 2020-05-04 | End: 2020-08-02

## 2020-05-04 RX ORDER — TRAMADOL HYDROCHLORIDE 50 MG/1
50 TABLET ORAL EVERY 6 HOURS PRN
Qty: 120 TABLET | Refills: 3 | Status: SHIPPED
Start: 2020-05-04 | End: 2020-07-13 | Stop reason: SDUPTHER

## 2020-07-06 ENCOUNTER — TELEPHONE (OUTPATIENT)
Dept: FAMILY MEDICINE CLINIC | Age: 80
End: 2020-07-06

## 2020-07-06 NOTE — TELEPHONE ENCOUNTER
I am really not sure with is meant by this. You may need to call her home health care company and figure out exactly what this prescription is.

## 2020-07-07 NOTE — TELEPHONE ENCOUNTER
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

## 2020-07-13 ENCOUNTER — OFFICE VISIT (OUTPATIENT)
Dept: PRIMARY CARE CLINIC | Age: 80
End: 2020-07-13
Payer: MEDICARE

## 2020-07-13 VITALS
HEART RATE: 72 BPM | BODY MASS INDEX: 24.41 KG/M2 | WEIGHT: 143 LBS | OXYGEN SATURATION: 95 % | SYSTOLIC BLOOD PRESSURE: 124 MMHG | DIASTOLIC BLOOD PRESSURE: 80 MMHG | RESPIRATION RATE: 18 BRPM | HEIGHT: 64 IN | TEMPERATURE: 97 F

## 2020-07-13 PROBLEM — F32.9 REACTIVE DEPRESSION: Status: ACTIVE | Noted: 2020-07-13

## 2020-07-13 PROCEDURE — 99213 OFFICE O/P EST LOW 20 MIN: CPT | Performed by: INTERNAL MEDICINE

## 2020-07-13 RX ORDER — PANTOPRAZOLE SODIUM 40 MG/1
TABLET, DELAYED RELEASE ORAL
COMMUNITY
End: 2021-05-18 | Stop reason: SDUPTHER

## 2020-07-13 RX ORDER — METOPROLOL SUCCINATE 50 MG/1
TABLET, EXTENDED RELEASE ORAL
Qty: 90 TABLET | Refills: 1 | Status: SHIPPED
Start: 2020-07-13 | End: 2021-01-15

## 2020-07-13 RX ORDER — TRAMADOL HYDROCHLORIDE 50 MG/1
50 TABLET ORAL EVERY 6 HOURS PRN
Qty: 120 TABLET | Refills: 3 | Status: SHIPPED | OUTPATIENT
Start: 2020-07-13 | End: 2020-10-11

## 2020-07-13 RX ORDER — ALPRAZOLAM 0.25 MG/1
TABLET ORAL
Qty: 30 TABLET | Refills: 3 | Status: SHIPPED | OUTPATIENT
Start: 2020-07-13 | End: 2020-08-13

## 2020-07-13 RX ORDER — ESTRADIOL 0.05 MG/D
1 FILM, EXTENDED RELEASE TRANSDERMAL SEE ADMIN INSTRUCTIONS
Qty: 4 PATCH | Refills: 2 | Status: SHIPPED
Start: 2020-07-13 | End: 2021-02-04

## 2020-07-13 RX ORDER — CITALOPRAM 10 MG/1
10 TABLET ORAL DAILY
Qty: 30 TABLET | Refills: 5 | Status: SHIPPED
Start: 2020-07-13 | End: 2021-01-04

## 2020-07-13 RX ORDER — ALBUTEROL SULFATE 90 UG/1
2 AEROSOL, METERED RESPIRATORY (INHALATION)
Qty: 1 INHALER | Refills: 5 | Status: SHIPPED
Start: 2020-07-13 | End: 2021-07-19 | Stop reason: SDUPTHER

## 2020-07-13 RX ORDER — ALPRAZOLAM 0.25 MG/1
TABLET ORAL
COMMUNITY
Start: 2020-06-20 | End: 2020-07-13 | Stop reason: SDUPTHER

## 2020-07-13 RX ORDER — IPRATROPIUM BROMIDE AND ALBUTEROL SULFATE 2.5; .5 MG/3ML; MG/3ML
1 SOLUTION RESPIRATORY (INHALATION) EVERY 6 HOURS PRN
Qty: 360 ML | Refills: 5 | Status: SHIPPED
Start: 2020-07-13 | End: 2021-06-28 | Stop reason: SDUPTHER

## 2020-07-13 NOTE — PROGRESS NOTES
Patient is done well in terms of her breathing with no recent exacerbations of her COPD. Patient has not noticed any increased peripheral lymphedema of the lower extremity. According to the family members the patient is depressed. Patient does admit that she could cry easily. She denies any suicidal or homicidal ideation. She states there is a family history of depression. Patient is followed by Dr. Haily Tanner for her iron deficiency anemia. She did receive iron transfusion. Her last appointment at the pulmonologist was canceled. Her arthritis symptomatology is reasonably controlled on current medications. ROS:  Const: General health stated as fair. Eyes: Denies eye symptoms. CV: Reports hypertension. Resp: Reports COPD, chronic hypoxemia. Recent pneumonia. Right hilar mass. : Severe hot flushing Denies urinary problems. Musculo: Reports arthralgias and arthritis. Breast: Denies breast problems. Endocrine: Denies polydipsia, polyphagia and polyuria. Current Outpatient Medications   Medication Sig Dispense Refill    pantoprazole (PROTONIX) 40 MG tablet Take by mouth      ALPRAZolam (XANAX) 0.25 MG tablet take 1 tablet by mouth at bedtime if needed for sleep      traMADol (ULTRAM) 50 MG tablet Take 1 tablet by mouth every 6 hours as needed for Pain (as needed) for up to 90 days. 120 tablet 3    ipratropium-albuterol (DUONEB) 0.5-2.5 (3) MG/3ML SOLN nebulizer solution Inhale 3 mLs into the lungs every 6 hours as needed for Shortness of Breath 360 mL 5    estradiol (VIVELLE) 0.05 MG/24HR Place 1 patch onto the skin See Admin Instructions Apply transderm patch once weekly.  Dispense 4 patches for 28days 4 patch 2    albuterol sulfate HFA (VENTOLIN HFA) 108 (90 Base) MCG/ACT inhaler Inhale 2 puffs into the lungs every 4-6 hours as needed for Wheezing or Shortness of Breath 1 Inhaler 5    metoprolol succinate (TOPROL XL) 50 MG extended release tablet take 1 tablet by mouth once daily 90 tablet 1    fluticasone-salmeterol (ADVAIR DISKUS) 250-50 MCG/DOSE AEPB Inhale 1 puff into the lungs 2 times daily       No current facility-administered medications for this visit. PMH:  Shot Record:  Methp 40-Methylprednisolone Acetate 40 MG 19  Irul00-Dcmyfks 80MG NDC [address]-89 11  -Rlomcr (Albuterol & Ipratropium) 19  -Mjzh Medrol To 125 MG Injection 19  -Poyg Medrol 20MG Injection 13  90732-Pneumococcal Vaccine (Pneumovax) 11  90688-Influenza Vaccine- Fluzone Iiv4 Quadrivalent Vial, Ages 3 Yrs + 17  90658-Influenza Vaccine Fluvirin Iiv3 (ages 3 and older) 09/28/15 09/07/11. Health Maintenance:  Mammogram - (2015)  [address] [place] [address]  1940 Page #2  Bone Density Test Screening - (2009)  Mammogram Screening - (2015)  Colonoscopy - (2017)  Colonoscopy Screening - (2017)  Colonoscopy -  [address],  DIVERTICULAR DX-  - NEXT 2022  PFT'S - ,  MODERATE TO SEVERE COPD  2016-MOD/SEVERE COPD  2D ECHO -   Influenza Vaccination - ()  Pneumonia Vaccination - ()  Prevnar Vaccine - ()  Mammogram Screening - (11/15/2017) slip given  She has had pneumonia in the past.  Also, a questionable pulmonary emboli back in , with no recurrence, of an unidentified source. HTN- GOOD RESPONSE TO TOPROL XL  [id number]-8/17/18- DID IMPROVE WITH BRONCODILATOR-REFUSES O2 THERAPY  CHRONIC TOBACCO ABUSE-COUNSELING GIVEN EVERY VISIT  BREAST CYST- NEED REPEAT US IN SPRING 2014  PNEUMONIA  - 2.5cm right hilar node  Surgical Hx:  Total Hysterectomy  Knee Replacement - (3/2016)The patient has had a previous total hysterectomy. colonoscopy [facility]  diverticular dx  Reviewed, no changes. FH:  Noncontributory  Reviewed, no changes. SH: Patient is . Personal Habits: Current cigarette smoker, has smoked 1/2 pack daily.  NOW VAPOR Drinks alcohol occasionally. Does not exercise. Reviewed, no changes. Date: 04/22/2019  Was the patient queried about smoking behavior? Yes   Does the patient currently smoke? Smoking: Patient is a current smoker, smokes every day - (11/9/2015) VAPOR. Objective  Vitals:    07/13/20 1134   BP: 124/80   Pulse: 72   Resp: 18   Temp: 97 °F (36.1 °C)   TempSrc: Temporal   SpO2: 95%   Weight: 143 lb (64.9 kg)   Height: 5' 4\" (1.626 m)       Exam:  Const: Appears healthy,well developed and well nourished. Appears frail. Eyes: EOMI in both eyes. PERRL. ENMT: External canals are clear and dry. Tympanic membranes are intact. External nose WNL. Neck: Supple and symmetric. Palpation reveals no adenopathy. No masses appreciated. Thyroid exhibits no nodules  or thyromegaly. No JVD. Resp: Respirations are unlabored. Respiration rate is normal. Auscultate markedly decreased airflow. No rales or  wheezes appreciated over the lungs bilaterally. CV: Rhythm is regular. S1 is normal. S2 is normal. No heart murmur appreciated. Extremities: No clubbing, cyanosis or edema. Musculo: Walks with a normal gait for age. Upper Extremities: Crepitation and limitation with movement of the upper  extremities bilaterally. Lower Extremities: Crepitation and limitation of the lower extremities. Skin: Skin is warm and dry. Neuro: Alert and oriented x3. Mood is normal. Affect is normal. Speech is articulate and fluent. Reflexes: DTR's are  intact, symmetric and 2+ bilaterally. Psych: Patient's attitude is cooperative. Patient's affect is appropriate. Judgement is realistic. Insight is appropriate. Gladys Obrien was seen today for medication refill. Diagnoses and all orders for this visit:    Anxiety  -     ALPRAZolam (XANAX) 0.25 MG tablet; take 1 tablet by mouth at bedtime if needed for sleep    Menopause  -     estradiol (VIVELLE) 0.05 MG/24HR; Place 1 patch onto the skin See Admin Instructions Apply transderm patch once weekly.  Dispense 4 patches

## 2020-08-25 ENCOUNTER — OFFICE VISIT (OUTPATIENT)
Dept: FAMILY MEDICINE CLINIC | Age: 80
End: 2020-08-25
Payer: MEDICARE

## 2020-08-25 VITALS
SYSTOLIC BLOOD PRESSURE: 140 MMHG | WEIGHT: 142 LBS | HEART RATE: 77 BPM | DIASTOLIC BLOOD PRESSURE: 76 MMHG | BODY MASS INDEX: 24.24 KG/M2 | OXYGEN SATURATION: 88 % | HEIGHT: 64 IN

## 2020-08-25 PROCEDURE — 99214 OFFICE O/P EST MOD 30 MIN: CPT | Performed by: INTERNAL MEDICINE

## 2020-08-25 RX ORDER — PREDNISONE 20 MG/1
40 TABLET ORAL DAILY
Qty: 20 TABLET | Refills: 0 | Status: SHIPPED | OUTPATIENT
Start: 2020-08-25 | End: 2020-09-04

## 2020-08-25 NOTE — PROGRESS NOTES
Patient recently was started on Celexa with very good result. She is no longer crying frequently. Patient recently has had an increased amount of cough and sputum production. She denies any mucopurulent sputum. She denies any fever, chills, sweats. She is hearing her self in terms of wheeze a little more prominently than in the past.  She is oxygenating well on her current oxygen flow. Patient denies orthopnea or PND. She denies any pleuritic pain. There is been no hemoptysis. ROS:  Const: General health stated as fair. Eyes: Denies eye symptoms. CV: Reports hypertension. Resp: Reports COPD, chronic hypoxemia. : Severe hot flushing Denies urinary problems. Musculo: Reports arthralgias and arthritis. Breast: Denies breast problems. Endocrine: Denies polydipsia, polyphagia and polyuria. Current Outpatient Medications   Medication Sig Dispense Refill    pantoprazole (PROTONIX) 40 MG tablet Take by mouth      estradiol (VIVELLE) 0.05 MG/24HR Place 1 patch onto the skin See Admin Instructions Apply transderm patch once weekly. Dispense 4 patches for 28days 4 patch 2    albuterol sulfate HFA (VENTOLIN HFA) 108 (90 Base) MCG/ACT inhaler Inhale 2 puffs into the lungs every 4-6 hours as needed for Wheezing or Shortness of Breath 1 Inhaler 5    ipratropium-albuterol (DUONEB) 0.5-2.5 (3) MG/3ML SOLN nebulizer solution Inhale 3 mLs into the lungs every 6 hours as needed for Shortness of Breath 360 mL 5    metoprolol succinate (TOPROL XL) 50 MG extended release tablet take 1 tablet by mouth once daily 90 tablet 1    traMADol (ULTRAM) 50 MG tablet Take 1 tablet by mouth every 6 hours as needed for Pain (as needed) for up to 90 days. 120 tablet 3    citalopram (CELEXA) 10 MG tablet Take 1 tablet by mouth daily 30 tablet 5    fluticasone-salmeterol (ADVAIR DISKUS) 250-50 MCG/DOSE AEPB Inhale 1 puff into the lungs 2 times daily       No current facility-administered medications for this visit. PMH:  Shot Record:  Methp 40-Methylprednisolone Acetate 40 MG 19  [id number]-Mwotbli 80MG NDC [address]-25 11  -Hsfwws (Albuterol & Ipratropium) 19  -Ddko Medrol To 125 MG Injection 19  -Htyh Medrol 20MG Injection 13  90732-Pneumococcal Vaccine (Pneumovax) 11  90688-Influenza Vaccine- Fluzone Iiv4 Quadrivalent Vial, Ages 3 Yrs + 17  90658-Influenza Vaccine Fluvirin Iiv3 (ages 3 and older) 09/28/15 09/07/11. Health Maintenance:  Mammogram - (2015)  [address] [place] [address]  1940 Page #2  Bone Density Test Screening - (2009)  Mammogram Screening - (2015)  Colonoscopy - (2017)  Colonoscopy Screening - (2017)  Colonoscopy -  [address],  DIVERTICULAR DX-  - NEXT   PFT'S - ,  MODERATE TO SEVERE COPD  2016-MOD/SEVERE COPD  2D ECHO -   Influenza Vaccination - ()  Pneumonia Vaccination - ()  Prevnar Vaccine - ()  Mammogram Screening - (11/15/2017) slip given  She has had pneumonia in the past.  Also, a questionable pulmonary emboli back in , with no recurrence, of an unidentified source. HTN- GOOD RESPONSE TO TOPROL XL  [id number]-5/17/18- DID IMPROVE WITH BRONCODILATOR-REFUSES O2 THERAPY  CHRONIC TOBACCO ABUSE-COUNSELING GIVEN EVERY VISIT  BREAST CYST- NEED REPEAT US IN SPRING 2014  PNEUMONIA  - 2.5cm right hilar node  Depression- 2020- good response to Celexa  Surgical Hx:  Total Hysterectomy  Knee Replacement - (3/2016)The patient has had a previous total hysterectomy. colonoscopy [facility]  diverticular dx  Reviewed, no changes. FH:  Noncontributory  Reviewed, no changes. SH: Patient is . Personal Habits: Current cigarette smoker, has smoked 1/2 pack daily. NOW VAPOR Drinks alcohol occasionally. Does not exercise. Reviewed, no changes. Date: 2019  Was the patient queried about smoking behavior?  Yes   Does the patient other medications will be continued.

## 2020-09-08 NOTE — TELEPHONE ENCOUNTER
Name of Medication(s) Requested:  [facility]    Pharmacy Requested:   [facility]    Medication(s) pended? [x] Yes  [] No    Last Appointment:  8/25/2020    Future appts:  Future Appointments   Date Time Provider Emily Gamez   12/10/2020  3:15 PM Javier Bucio MD [facility]        Does patient need call back?   [] Yes  [x] No

## 2020-12-10 ENCOUNTER — OFFICE VISIT (OUTPATIENT)
Dept: PRIMARY CARE CLINIC | Age: 80
End: 2020-12-10
Payer: MEDICARE

## 2020-12-10 VITALS
HEIGHT: 64 IN | SYSTOLIC BLOOD PRESSURE: 120 MMHG | WEIGHT: 143 LBS | OXYGEN SATURATION: 92 % | DIASTOLIC BLOOD PRESSURE: 78 MMHG | TEMPERATURE: 97.5 F | HEART RATE: 100 BPM | BODY MASS INDEX: 24.41 KG/M2

## 2020-12-10 DIAGNOSIS — J43.2 CENTRILOBULAR EMPHYSEMA (HCC): ICD-10-CM

## 2020-12-10 PROBLEM — R07.89 STERNAL PAIN: Status: ACTIVE | Noted: 2020-12-10

## 2020-12-10 LAB
BASOPHILS ABSOLUTE: 0.06 E9/L (ref 0–0.2)
BASOPHILS RELATIVE PERCENT: 0.7 % (ref 0–2)
EOSINOPHILS ABSOLUTE: 0.33 E9/L (ref 0.05–0.5)
EOSINOPHILS RELATIVE PERCENT: 3.6 % (ref 0–6)
HCT VFR BLD CALC: 43.7 % (ref 34–48)
HEMOGLOBIN: 13.8 G/DL (ref 11.5–15.5)
IMMATURE GRANULOCYTES #: 0.07 E9/L
IMMATURE GRANULOCYTES %: 0.8 % (ref 0–5)
LYMPHOCYTES ABSOLUTE: 2 E9/L (ref 1.5–4)
LYMPHOCYTES RELATIVE PERCENT: 21.7 % (ref 20–42)
MCH RBC QN AUTO: 29.8 PG (ref 26–35)
MCHC RBC AUTO-ENTMCNC: 31.6 % (ref 32–34.5)
MCV RBC AUTO: 94.4 FL (ref 80–99.9)
MONOCYTES ABSOLUTE: 0.95 E9/L (ref 0.1–0.95)
MONOCYTES RELATIVE PERCENT: 10.3 % (ref 2–12)
NEUTROPHILS ABSOLUTE: 5.82 E9/L (ref 1.8–7.3)
NEUTROPHILS RELATIVE PERCENT: 62.9 % (ref 43–80)
PDW BLD-RTO: 13.7 FL (ref 11.5–15)
PLATELET # BLD: 356 E9/L (ref 130–450)
PMV BLD AUTO: 10.1 FL (ref 7–12)
RBC # BLD: 4.63 E12/L (ref 3.5–5.5)
WBC # BLD: 9.2 E9/L (ref 4.5–11.5)

## 2020-12-10 PROCEDURE — 99214 OFFICE O/P EST MOD 30 MIN: CPT | Performed by: INTERNAL MEDICINE

## 2020-12-10 RX ORDER — ESTRADIOL 0.05 MG/D
PATCH TRANSDERMAL
COMMUNITY
Start: 2020-10-23 | End: 2020-12-10 | Stop reason: ALTCHOICE

## 2020-12-10 RX ORDER — TRAMADOL HYDROCHLORIDE 50 MG/1
TABLET ORAL
COMMUNITY
Start: 2020-11-16 | End: 2020-12-26 | Stop reason: SDUPTHER

## 2020-12-10 RX ORDER — ALPRAZOLAM 0.25 MG/1
TABLET ORAL
COMMUNITY
Start: 2020-12-09 | End: 2021-03-01

## 2020-12-10 NOTE — PROGRESS NOTES
Patient continues to have good success with Celexa. Her breathing has been actually quite good and she has not required hospitalization. She did have a fever earlier in the week and felt flulike. The symptoms have resolved. She is complaining of sternal discomfort. This is at the superior aspect of the sternum and she is concerned about what she is feeling as an enlargement there. She denies any breast issues and states that she has had mammography within the last several years. Patient denies any pleuritic pain. There is been no swelling of her lower extremity. She has gained some weight. Oxygen saturations are good on current oxygen flow. She states her dyspnea with exertion is stable. ROS:  Const: General health stated as fair. Eyes: Denies eye symptoms. CV: Reports hypertension. Resp: Reports COPD, chronic hypoxemia. : Severe hot flushing Denies urinary problems. Musculo: Reports arthralgias and arthritis. Breast: Denies breast problems. Endocrine: Denies polydipsia, polyphagia and polyuria. Current Outpatient Medications   Medication Sig Dispense Refill    ALPRAZolam (XANAX) 0.25 MG tablet       traMADol (ULTRAM) 50 MG tablet take 1 tablet by mouth every 6 hours if needed FOR PAIN FOR UP TO 90 DAYS      fluticasone-salmeterol (ADVAIR DISKUS) 250-50 MCG/DOSE AEPB Inhale 1 puff into the lungs 2 times daily 60 each 3    pantoprazole (PROTONIX) 40 MG tablet Take by mouth      estradiol (VIVELLE) 0.05 MG/24HR Place 1 patch onto the skin See Admin Instructions Apply transderm patch once weekly.  Dispense 4 patches for 28days 4 patch 2    albuterol sulfate HFA (VENTOLIN HFA) 108 (90 Base) MCG/ACT inhaler Inhale 2 puffs into the lungs every 4-6 hours as needed for Wheezing or Shortness of Breath 1 Inhaler 5    ipratropium-albuterol (DUONEB) 0.5-2.5 (3) MG/3ML SOLN nebulizer solution Inhale 3 mLs into the lungs every 6 hours as needed for Shortness of Breath 360 mL 5    metoprolol succinate (TOPROL XL) 50 MG extended release tablet take 1 tablet by mouth once daily 90 tablet 1    citalopram (CELEXA) 10 MG tablet Take 1 tablet by mouth daily 30 tablet 5     No current facility-administered medications for this visit. PMH:  Shot Record:  Methp 40-Methylprednisolone Acetate 40 MG 01/12/19  [id number] 80MG NDC [address]-30 03/07/11  -Ycyhyd (Albuterol & Ipratropium) 02/07/19 12/04/18  -Rtby Medrol To 125 MG Injection 02/07/19  -Ulrh Medrol 20MG Injection 11/25/13 06/30/04  90732-Pneumococcal Vaccine (Pneumovax) 09/07/11  90688-Influenza Vaccine- Fluzone Iiv4 Quadrivalent Vial, Ages 3 Yrs + 09/19/17  90658-Influenza Vaccine Fluvirin Iiv3 (ages 3 and older) 09/28/15 09/07/11. Health Maintenance:  Mammogram - (12/2/2015)  Bone Density Test Screening - (5/26/2009)  Mammogram Screening - (12/2/2015)  Colonoscopy - (7/20/2017)  Colonoscopy Screening - (7/20/2017)  Colonoscopy - [address] DX, 2008 DIVERTICULAR DX- 2017 - NEXT 2022  PFT'S - 2004, 9/11 MODERATE TO SEVERE COPD  1/2016-MOD/SEVERE COPD  2D ECHO - 2002  Influenza Vaccination - (2016)  Pneumonia Vaccination - (2011)  Prevnar Vaccine - (2014)  Mammogram Screening - (11/15/2017) slip given  She has had pneumonia in the past.  Also, a questionable pulmonary emboli back in 1994, with no recurrence, of an unidentified source. HTN- GOOD RESPONSE TO TOPROL XL  [id number]-4/17/18- DID IMPROVE WITH BRONCODILATOR-REFUSES O2 THERAPY  CHRONIC TOBACCO ABUSE-COUNSELING GIVEN EVERY VISIT  BREAST CYST- NEED REPEAT US IN SPRING 2014  PNEUMONIA 12.2019 - 2.5cm right hilar node  Depression- 7/2020- good response to Celexa  Surgical Hx:  Total Hysterectomy  Knee Replacement - (3/2016)The patient has had a previous total hysterectomy. colonoscopy [facility] 4/08 diverticular dx  Reviewed, no changes. FH:  Noncontributory  Reviewed, no changes. SH: Patient is .   Personal Habits: Current cigarette smoker, has smoked 1/2 pack daily. NOW VAPOR Drinks alcohol occasionally. Does not exercise. Reviewed, no changes. Date: 04/22/2019  Was the patient queried about smoking behavior? Yes   Does the patient currently smoke? Smoking: Patient is a current smoker, smokes every day - (11/9/2015) VAPOR. Objective  Vitals:    12/10/20 1515   BP: 120/78   Pulse: 100   Temp: 97.5 °F (36.4 °C)   SpO2: 92%   Weight: 143 lb (64.9 kg)   Height: 5' 4\" (1.626 m)       Exam:  Const: Appears healthy, Appears frail. Eyes: EOMI in both eyes. PERRL. ENMT: External canals are clear and dry. Tympanic membranes are intact. External nose WNL. Neck: Supple and symmetric. Palpation reveals no adenopathy. No masses appreciated. Thyroid exhibits no nodules  or thyromegaly. No JVD. Slight soft enlargement at the top of the sternum. Questionable underlying bony enlargement. Resp: Respirations are unlabored. Respiration rate is normal. Auscultate markedly decreased airflow. Rhonchorous sounds is especially in the right base. These totally cleared with cough. CV: Rhythm is regular. S1 is normal. S2 is normal. No heart murmur appreciated. Extremities: No clubbing, cyanosis or edema. Musculo: Walks with a normal gait for age. Upper Extremities: Crepitation and limitation with movement of the upper  extremities bilaterally. Lower Extremities: Crepitation and limitation of the lower extremities. Skin: Skin is warm and dry. Neuro: Alert and oriented x3. Mood is normal. Affect is normal. Speech is articulate and fluent. Reflexes: DTR's are  intact, symmetric and 2+ bilaterally. Psych: Patient's attitude is cooperative. Patient's affect is appropriate. Judgement is realistic. Insight is appropriate. Solomon Hernandez was seen today for copd. Diagnoses and all orders for this visit:    Sternal pain  -     XR STERNUM (MIN 2 VIEWS);  Future    Essential hypertension    Centrilobular emphysema (HCC)  -     CBC Auto Differential; Future  - Comprehensive Metabolic Panel; Future    Gastroesophageal reflux disease without esophagitis    Anxiety    Primary osteoarthritis involving multiple joints    Patient had previous CT of the chest done after her hospitalization for pneumonia. This needs to be repeated after I reviewed this. We need to make sure that there is no consolidation any further. This will be ordered. Sternal x-ray will be done. Patient is to have lab work today. Flu vaccination is already been given according to the patient.

## 2020-12-11 LAB
ALBUMIN SERPL-MCNC: 4.2 G/DL (ref 3.5–5.2)
ALP BLD-CCNC: 91 U/L (ref 35–104)
ALT SERPL-CCNC: 18 U/L (ref 0–32)
ANION GAP SERPL CALCULATED.3IONS-SCNC: 13 MMOL/L (ref 7–16)
AST SERPL-CCNC: 24 U/L (ref 0–31)
BILIRUB SERPL-MCNC: 0.3 MG/DL (ref 0–1.2)
BUN BLDV-MCNC: 16 MG/DL (ref 8–23)
CALCIUM SERPL-MCNC: 9.8 MG/DL (ref 8.6–10.2)
CHLORIDE BLD-SCNC: 98 MMOL/L (ref 98–107)
CO2: 26 MMOL/L (ref 22–29)
CREAT SERPL-MCNC: 0.7 MG/DL (ref 0.5–1)
GFR AFRICAN AMERICAN: >60
GFR NON-AFRICAN AMERICAN: >60 ML/MIN/1.73
GLUCOSE BLD-MCNC: 97 MG/DL (ref 74–99)
POTASSIUM SERPL-SCNC: 4.7 MMOL/L (ref 3.5–5)
SODIUM BLD-SCNC: 137 MMOL/L (ref 132–146)
TOTAL PROTEIN: 7.3 G/DL (ref 6.4–8.3)

## 2020-12-21 RX ORDER — TRAMADOL HYDROCHLORIDE 50 MG/1
TABLET ORAL
OUTPATIENT
Start: 2020-12-21

## 2020-12-22 NOTE — TELEPHONE ENCOUNTER
I called Galaronnie Johnson and told her it should have been sent home with her and she said she did have a 'packet' of papers when she left, so she will look for it.

## 2020-12-22 NOTE — TELEPHONE ENCOUNTER
Yoko Eng called back and she said she looked through the papers that she took home from her visit and the prescription was not there. When you look at the prescription in the chart, there is an error message on it, so maybe it did not print? JOSE, her  passed away yesterday.

## 2020-12-26 RX ORDER — TRAMADOL HYDROCHLORIDE 50 MG/1
50 TABLET ORAL EVERY 6 HOURS PRN
Qty: 120 TABLET | Refills: 2 | Status: SHIPPED | OUTPATIENT
Start: 2020-12-26 | End: 2021-01-25

## 2021-01-04 RX ORDER — CITALOPRAM 10 MG/1
TABLET ORAL
Qty: 30 TABLET | Refills: 5 | Status: SHIPPED
Start: 2021-01-04 | End: 2021-05-18 | Stop reason: SDUPTHER

## 2021-01-08 RX ORDER — CITALOPRAM 10 MG/1
TABLET ORAL
Qty: 30 TABLET | Refills: 5 | Status: CANCELLED | OUTPATIENT
Start: 2021-01-08

## 2021-01-15 DIAGNOSIS — I10 ESSENTIAL HYPERTENSION: ICD-10-CM

## 2021-01-15 RX ORDER — METOPROLOL SUCCINATE 50 MG/1
TABLET, EXTENDED RELEASE ORAL
Qty: 90 TABLET | Refills: 1 | Status: SHIPPED
Start: 2021-01-15 | End: 2021-05-18 | Stop reason: SDUPTHER

## 2021-02-04 DIAGNOSIS — Z78.0 MENOPAUSE: ICD-10-CM

## 2021-02-04 RX ORDER — ESTRADIOL 0.05 MG/D
PATCH TRANSDERMAL
Qty: 4 PATCH | Refills: 5 | Status: SHIPPED
Start: 2021-02-04 | End: 2021-05-18 | Stop reason: SDUPTHER

## 2021-02-04 NOTE — TELEPHONE ENCOUNTER
Last Appointment:  12/10/2020  Future Appointments   Date Time Provider Emily Gamez   4/12/2021  2:00 PM Robbie Sin  [facility]

## 2021-02-22 NOTE — TELEPHONE ENCOUNTER
Name of Medication(s) Requested:  [facility]    Pharmacy Requested:   [facility]    Medication(s) pended? [x] Yes  [] No    Last Appointment:  12/10/2020    Future appts:  Future Appointments   Date Time Provider Emily Vera   4/12/2021  2:00 PM Clemencia Bright MD [facility]        Does patient need call back?   [] Yes  [x] No

## 2021-02-26 DIAGNOSIS — F51.01 PRIMARY INSOMNIA: Primary | ICD-10-CM

## 2021-03-01 RX ORDER — ALPRAZOLAM 0.25 MG/1
TABLET ORAL
Qty: 30 TABLET | Refills: 1 | Status: SHIPPED | OUTPATIENT
Start: 2021-03-01 | End: 2021-03-31

## 2021-03-01 NOTE — TELEPHONE ENCOUNTER
Last Appointment:  12/10/2020  Future Appointments   Date Time Provider Emily Gamez   4/12/2021  2:00 PM Josey Ritchie MD [id number] calling for refill on xanax to [facility]

## 2021-03-17 ENCOUNTER — OFFICE VISIT (OUTPATIENT)
Dept: FAMILY MEDICINE CLINIC | Age: 81
End: 2021-03-17
Payer: MEDICARE

## 2021-03-17 VITALS
WEIGHT: 143 LBS | HEIGHT: 64 IN | SYSTOLIC BLOOD PRESSURE: 130 MMHG | DIASTOLIC BLOOD PRESSURE: 78 MMHG | HEART RATE: 89 BPM | TEMPERATURE: 98.2 F | RESPIRATION RATE: 18 BRPM | BODY MASS INDEX: 24.41 KG/M2 | OXYGEN SATURATION: 94 %

## 2021-03-17 DIAGNOSIS — R05.9 COUGH: ICD-10-CM

## 2021-03-17 DIAGNOSIS — J02.9 SORE THROAT: Primary | ICD-10-CM

## 2021-03-17 LAB
Lab: NORMAL
PERFORMING INSTRUMENT: NORMAL
QC PASS/FAIL: NORMAL
SARS-COV-2, POC: NORMAL

## 2021-03-17 PROCEDURE — 87426 SARSCOV CORONAVIRUS AG IA: CPT | Performed by: PHYSICIAN ASSISTANT

## 2021-03-17 PROCEDURE — 99213 OFFICE O/P EST LOW 20 MIN: CPT | Performed by: PHYSICIAN ASSISTANT

## 2021-03-17 RX ORDER — DOXYCYCLINE 100 MG/1
100 CAPSULE ORAL 2 TIMES DAILY
Qty: 20 CAPSULE | Refills: 0 | Status: SHIPPED | OUTPATIENT
Start: 2021-03-17 | End: 2021-03-27

## 2021-03-17 RX ORDER — TRAMADOL HYDROCHLORIDE 50 MG/1
TABLET ORAL
COMMUNITY
Start: 2021-02-26 | End: 2021-03-26 | Stop reason: SDUPTHER

## 2021-03-17 RX ORDER — CALCIUM CARBONATE 200(500)MG
500 TABLET,CHEWABLE ORAL
COMMUNITY

## 2021-03-17 RX ORDER — PREDNISONE 10 MG/1
TABLET ORAL
Qty: 20 TABLET | Refills: 0 | Status: SHIPPED | OUTPATIENT
Start: 2021-03-17 | End: 2021-03-25

## 2021-03-17 NOTE — PROGRESS NOTES
MG chewable tablet, Take 500 mg by mouth, Disp: , Rfl:     predniSONE (DELTASONE) 10 MG tablet, Take 4 tablets by mouth daily for 2 days, THEN 3 tablets daily for 2 days, THEN 2 tablets daily for 2 days, THEN 1 tablet daily for 2 days. , Disp: 20 tablet, Rfl: 0    doxycycline monohydrate (MONODOX) 100 MG capsule, Take 1 capsule by mouth 2 times daily for 10 days, Disp: 20 capsule, Rfl: 0    ALPRAZolam (XANAX) 0.25 MG tablet, take 1 tablet by mouth at bedtime if needed FOR SLEEP, Disp: 30 tablet, Rfl: 1    fluticasone-salmeterol (ADVAIR DISKUS) 250-50 MCG/DOSE AEPB, Inhale 1 puff into the lungs 2 times daily, Disp: 60 each, Rfl: 3    estradiol (CLIMARA) 0.05 MG/24HR, PLACE 1 PATCH ONTO THE SKIN ONCE WEEKLY, Disp: 4 patch, Rfl: 5    metoprolol succinate (TOPROL XL) 50 MG extended release tablet, take 1 tablet by mouth once daily, Disp: 90 tablet, Rfl: 1    citalopram (CELEXA) 10 MG tablet, take 1 tablet by mouth once daily, Disp: 30 tablet, Rfl: 5    pantoprazole (PROTONIX) 40 MG tablet, Take by mouth, Disp: , Rfl:     ipratropium-albuterol (DUONEB) 0.5-2.5 (3) MG/3ML SOLN nebulizer solution, Inhale 3 mLs into the lungs every 6 hours as needed for Shortness of Breath, Disp: 360 mL, Rfl: 5    albuterol sulfate HFA (VENTOLIN HFA) 108 (90 Base) MCG/ACT inhaler, Inhale 2 puffs into the lungs every 4-6 hours as needed for Wheezing or Shortness of Breath, Disp: 1 Inhaler, Rfl: 5   Allergies   Allergen Reactions    Penicillins     Venlafaxine     Adhesive Tape Rash        Objective:  Vitals:    03/17/21 1221   BP: 130/78   Pulse: 89   Resp: 18   Temp: 98.2 °F (36.8 °C)   TempSrc: Temporal   SpO2: 94%   Weight: 143 lb (64.9 kg)   Height: 5' 4\" (1.626 m)        Exam:  Const: Appears healthy and well developed. No signs of acute distress present. Vitals reviewed per triage. Head/Face: Normocephalic, atraumatic. Facies is symmetric. Eyes: PERRL. Conjunctiva clear bilaterally.   ENMT: Tympanic membranes are pearly gray with good light reflex bilaterally. Nares are patent. Buccal mucosa is moist.  No erythema in the posterior pharynx. Neck: Supple and symmetric. Palpation reveals no adenopathy. Trachea midline. Resp: Lungs with scattered rhonchi throughout. Patient with a very productive cough. CV: S1 is normal. S2 is normal.Pulses are equal bilaterally. Musculo: Patient moves extremities without pain or limitation. No pedal edema. Skin: Skin is warm and dry. Neuro: Alert and oriented x3. Speech is articulate and fluent. Psych: Patient's mood and affect is appropriate to situation. Karen Holloway was seen today for pharyngitis. Diagnoses and all orders for this visit:    Sore throat  -     POCT COVID-19, Antigen  -     predniSONE (DELTASONE) 10 MG tablet; Take 4 tablets by mouth daily for 2 days, THEN 3 tablets daily for 2 days, THEN 2 tablets daily for 2 days, THEN 1 tablet daily for 2 days. -     doxycycline monohydrate (MONODOX) 100 MG capsule; Take 1 capsule by mouth 2 times daily for 10 days  -     Covid-19 Ambulatory; Future    Cough  -     predniSONE (DELTASONE) 10 MG tablet; Take 4 tablets by mouth daily for 2 days, THEN 3 tablets daily for 2 days, THEN 2 tablets daily for 2 days, THEN 1 tablet daily for 2 days. -     doxycycline monohydrate (MONODOX) 100 MG capsule; Take 1 capsule by mouth 2 times daily for 10 days  -     Covid-19 Ambulatory; Future      Covid test is negative here in the office. PCR send out was done. Do believe most likely this is an exacerbation of her COPD but given her risk factors she would qualify for the antibody treatment if she is Covid positive. I will start her on doxycycline and prednisone. I encouraged her to use her nebulizer at home. Follow-up with your PCP in 3-5 days if no improvement.  To the ED if any new or worsening symptoms    Seen By:    Lucrecia Hopper PA-C

## 2021-03-18 DIAGNOSIS — J02.9 SORE THROAT: ICD-10-CM

## 2021-03-18 DIAGNOSIS — R05.9 COUGH: ICD-10-CM

## 2021-03-19 LAB
SARS-COV-2: NOT DETECTED
SOURCE: NORMAL

## 2021-03-26 DIAGNOSIS — M15.9 PRIMARY OSTEOARTHRITIS INVOLVING MULTIPLE JOINTS: Primary | ICD-10-CM

## 2021-03-26 RX ORDER — TRAMADOL HYDROCHLORIDE 50 MG/1
50 TABLET ORAL EVERY 6 HOURS PRN
Qty: 56 TABLET | Refills: 3 | Status: SHIPPED | OUTPATIENT
Start: 2021-03-26 | End: 2021-04-09

## 2021-05-06 ENCOUNTER — OFFICE VISIT (OUTPATIENT)
Dept: FAMILY MEDICINE CLINIC | Age: 81
End: 2021-05-06
Payer: MEDICARE

## 2021-05-06 VITALS
RESPIRATION RATE: 18 BRPM | DIASTOLIC BLOOD PRESSURE: 80 MMHG | HEIGHT: 64 IN | SYSTOLIC BLOOD PRESSURE: 132 MMHG | OXYGEN SATURATION: 93 % | TEMPERATURE: 97 F | BODY MASS INDEX: 24.41 KG/M2 | WEIGHT: 143 LBS

## 2021-05-06 DIAGNOSIS — J18.9 PNEUMONIA DUE TO INFECTIOUS ORGANISM, UNSPECIFIED LATERALITY, UNSPECIFIED PART OF LUNG: ICD-10-CM

## 2021-05-06 DIAGNOSIS — J44.9 CHRONIC OBSTRUCTIVE PULMONARY DISEASE, UNSPECIFIED COPD TYPE (HCC): ICD-10-CM

## 2021-05-06 DIAGNOSIS — R05.9 COUGH: Primary | ICD-10-CM

## 2021-05-06 PROCEDURE — 99214 OFFICE O/P EST MOD 30 MIN: CPT | Performed by: PHYSICIAN ASSISTANT

## 2021-05-06 PROCEDURE — 94640 AIRWAY INHALATION TREATMENT: CPT | Performed by: PHYSICIAN ASSISTANT

## 2021-05-06 PROCEDURE — 96372 THER/PROPH/DIAG INJ SC/IM: CPT | Performed by: PHYSICIAN ASSISTANT

## 2021-05-06 RX ORDER — LEVOFLOXACIN 500 MG/1
500 TABLET, FILM COATED ORAL DAILY
Qty: 7 TABLET | Refills: 0 | Status: SHIPPED | OUTPATIENT
Start: 2021-05-06 | End: 2021-05-13

## 2021-05-06 RX ORDER — ALPRAZOLAM 0.25 MG/1
TABLET ORAL
COMMUNITY
Start: 2021-04-06 | End: 2021-05-18 | Stop reason: SDUPTHER

## 2021-05-06 RX ORDER — IBUPROFEN 200 MG
500 CAPSULE ORAL
COMMUNITY

## 2021-05-06 RX ORDER — METHYLPREDNISOLONE ACETATE 40 MG/ML
40 INJECTION, SUSPENSION INTRA-ARTICULAR; INTRALESIONAL; INTRAMUSCULAR; SOFT TISSUE ONCE
Status: COMPLETED | OUTPATIENT
Start: 2021-05-06 | End: 2021-05-06

## 2021-05-06 RX ORDER — IPRATROPIUM BROMIDE AND ALBUTEROL SULFATE 2.5; .5 MG/3ML; MG/3ML
1 SOLUTION RESPIRATORY (INHALATION) ONCE
Status: COMPLETED | OUTPATIENT
Start: 2021-05-06 | End: 2021-05-06

## 2021-05-06 RX ORDER — METHYLPREDNISOLONE SODIUM SUCCINATE 40 MG/ML
40 INJECTION, POWDER, LYOPHILIZED, FOR SOLUTION INTRAMUSCULAR; INTRAVENOUS ONCE
Status: DISCONTINUED | OUTPATIENT
Start: 2021-05-06 | End: 2021-05-06

## 2021-05-06 RX ORDER — TRAMADOL HYDROCHLORIDE 50 MG/1
TABLET ORAL
COMMUNITY
Start: 2021-04-26 | End: 2021-05-18 | Stop reason: SDUPTHER

## 2021-05-06 RX ORDER — HYDROCODONE BITARTRATE AND ACETAMINOPHEN 7.5; 325 MG/1; MG/1
TABLET ORAL
COMMUNITY
End: 2021-05-18

## 2021-05-06 RX ADMIN — METHYLPREDNISOLONE ACETATE 40 MG: 40 INJECTION, SUSPENSION INTRA-ARTICULAR; INTRALESIONAL; INTRAMUSCULAR; SOFT TISSUE at 10:24

## 2021-05-06 RX ADMIN — IPRATROPIUM BROMIDE AND ALBUTEROL SULFATE 1 AMPULE: 2.5; .5 SOLUTION RESPIRATORY (INHALATION) at 10:07

## 2021-05-06 NOTE — PROGRESS NOTES
headache. Diagnoses and all orders for this visit:    Cough  -     XR CHEST (2 VW); Future    Pneumonia due to infectious organism, unspecified laterality, unspecified part of lung    [facility] pulmonary disease, [facility]    Other orders  -     ipratropium-albuterol (DUONEB) nebulizer solution 1 ampule  -     Discontinue: methylPREDNISolone sodium (SOLU-MEDROL) injection 40 mg  -     methylPREDNISolone acetate (DEPO-MEDROL) injection 40 mg  -     levoFLOXacin (LEVAQUIN) 500 MG tablet; Take 1 tablet by mouth daily for 7 days       otc mucinex ER warning signs given   Return for Follow up with PCP in 5 days for re-evaluation. .      Discussed the use of probiotics to help prevent GI issues including C-diff colitis.       Seen By:    MARKUS Bennett

## 2021-05-18 ENCOUNTER — OFFICE VISIT (OUTPATIENT)
Dept: PRIMARY CARE CLINIC | Age: 81
End: 2021-05-18
Payer: MEDICARE

## 2021-05-18 VITALS
HEIGHT: 64 IN | HEART RATE: 72 BPM | TEMPERATURE: 97.4 F | BODY MASS INDEX: 23.56 KG/M2 | WEIGHT: 138 LBS | DIASTOLIC BLOOD PRESSURE: 72 MMHG | OXYGEN SATURATION: 93 % | SYSTOLIC BLOOD PRESSURE: 130 MMHG | RESPIRATION RATE: 20 BRPM

## 2021-05-18 DIAGNOSIS — F51.01 PRIMARY INSOMNIA: ICD-10-CM

## 2021-05-18 DIAGNOSIS — J43.2 CENTRILOBULAR EMPHYSEMA (HCC): Primary | ICD-10-CM

## 2021-05-18 DIAGNOSIS — M15.9 PRIMARY OSTEOARTHRITIS INVOLVING MULTIPLE JOINTS: ICD-10-CM

## 2021-05-18 DIAGNOSIS — I10 ESSENTIAL HYPERTENSION: ICD-10-CM

## 2021-05-18 DIAGNOSIS — F41.9 ANXIETY: ICD-10-CM

## 2021-05-18 DIAGNOSIS — Z78.0 MENOPAUSE: ICD-10-CM

## 2021-05-18 PROCEDURE — 99213 OFFICE O/P EST LOW 20 MIN: CPT | Performed by: NURSE PRACTITIONER

## 2021-05-18 RX ORDER — ESTRADIOL 0.05 MG/D
PATCH TRANSDERMAL
Qty: 4 PATCH | Refills: 5 | Status: SHIPPED
Start: 2021-05-18 | End: 2021-06-28 | Stop reason: SDUPTHER

## 2021-05-18 RX ORDER — TRAMADOL HYDROCHLORIDE 50 MG/1
50 TABLET ORAL EVERY 6 HOURS PRN
Qty: 30 TABLET | Refills: 0 | Status: SHIPPED
Start: 2021-05-26 | End: 2021-06-04 | Stop reason: SDUPTHER

## 2021-05-18 RX ORDER — HYDROCODONE BITARTRATE AND ACETAMINOPHEN 7.5; 325 MG/1; MG/1
1 TABLET ORAL DAILY
Qty: 30 TABLET | Refills: 0 | Status: CANCELLED | OUTPATIENT
Start: 2021-05-18 | End: 2021-06-17

## 2021-05-18 RX ORDER — CITALOPRAM 10 MG/1
TABLET ORAL
Qty: 30 TABLET | Refills: 5 | Status: SHIPPED
Start: 2021-05-18 | End: 2021-06-28

## 2021-05-18 RX ORDER — PANTOPRAZOLE SODIUM 40 MG/1
40 TABLET, DELAYED RELEASE ORAL DAILY
Qty: 30 TABLET | Refills: 5 | Status: SHIPPED
Start: 2021-05-18 | End: 2021-06-28 | Stop reason: SDUPTHER

## 2021-05-18 RX ORDER — METOPROLOL SUCCINATE 50 MG/1
TABLET, EXTENDED RELEASE ORAL
Qty: 90 TABLET | Refills: 1 | Status: SHIPPED
Start: 2021-05-18 | End: 2021-06-28 | Stop reason: SDUPTHER

## 2021-05-18 RX ORDER — ALPRAZOLAM 0.25 MG/1
TABLET ORAL
Qty: 30 TABLET | Refills: 0 | Status: SHIPPED | OUTPATIENT
Start: 2021-05-18 | End: 2021-06-18

## 2021-05-18 SDOH — ECONOMIC STABILITY: FOOD INSECURITY: WITHIN THE PAST 12 MONTHS, THE FOOD YOU BOUGHT JUST DIDN'T LAST AND YOU DIDN'T HAVE MONEY TO GET MORE.: NEVER TRUE

## 2021-05-18 SDOH — ECONOMIC STABILITY: FOOD INSECURITY: WITHIN THE PAST 12 MONTHS, YOU WORRIED THAT YOUR FOOD WOULD RUN OUT BEFORE YOU GOT MONEY TO BUY MORE.: NEVER TRUE

## 2021-05-18 ASSESSMENT — SOCIAL DETERMINANTS OF HEALTH (SDOH): HOW HARD IS IT FOR YOU TO PAY FOR THE VERY BASICS LIKE FOOD, HOUSING, MEDICAL CARE, AND HEATING?: NOT HARD AT ALL

## 2021-06-04 DIAGNOSIS — M15.9 PRIMARY OSTEOARTHRITIS INVOLVING MULTIPLE JOINTS: ICD-10-CM

## 2021-06-04 RX ORDER — TRAMADOL HYDROCHLORIDE 50 MG/1
50 TABLET ORAL EVERY 6 HOURS PRN
Qty: 120 TABLET | Refills: 2 | Status: SHIPPED
Start: 2021-06-04 | End: 2021-06-28 | Stop reason: SDUPTHER

## 2021-06-28 ENCOUNTER — OFFICE VISIT (OUTPATIENT)
Dept: PRIMARY CARE CLINIC | Age: 81
End: 2021-06-28
Payer: MEDICARE

## 2021-06-28 VITALS
DIASTOLIC BLOOD PRESSURE: 70 MMHG | TEMPERATURE: 97.4 F | SYSTOLIC BLOOD PRESSURE: 124 MMHG | HEIGHT: 64 IN | OXYGEN SATURATION: 91 % | BODY MASS INDEX: 23.73 KG/M2 | HEART RATE: 71 BPM | WEIGHT: 139 LBS

## 2021-06-28 VITALS
BODY MASS INDEX: 23.73 KG/M2 | HEIGHT: 64 IN | SYSTOLIC BLOOD PRESSURE: 124 MMHG | TEMPERATURE: 97.4 F | WEIGHT: 139 LBS | OXYGEN SATURATION: 91 % | HEART RATE: 71 BPM | DIASTOLIC BLOOD PRESSURE: 78 MMHG

## 2021-06-28 DIAGNOSIS — F11.20 OPIOID DEPENDENCE, UNCOMPLICATED (HCC): ICD-10-CM

## 2021-06-28 DIAGNOSIS — Z78.0 MENOPAUSE: ICD-10-CM

## 2021-06-28 DIAGNOSIS — M15.9 PRIMARY OSTEOARTHRITIS INVOLVING MULTIPLE JOINTS: ICD-10-CM

## 2021-06-28 DIAGNOSIS — F11.29 OPIOID DEPENDENCE WITH UNSPECIFIED OPIOID-INDUCED DISORDER (HCC): ICD-10-CM

## 2021-06-28 DIAGNOSIS — F41.9 ANXIETY: ICD-10-CM

## 2021-06-28 DIAGNOSIS — I10 ESSENTIAL HYPERTENSION: ICD-10-CM

## 2021-06-28 DIAGNOSIS — Z00.00 ROUTINE GENERAL MEDICAL EXAMINATION AT A HEALTH CARE FACILITY: Primary | ICD-10-CM

## 2021-06-28 DIAGNOSIS — Z00.00 ENCOUNTER FOR ANNUAL WELLNESS EXAM IN MEDICARE PATIENT: ICD-10-CM

## 2021-06-28 DIAGNOSIS — F11.99 OPIOID USE, UNSPECIFIED WITH UNSPECIFIED OPIOID-INDUCED DISORDER (HCC): ICD-10-CM

## 2021-06-28 LAB
ALBUMIN SERPL-MCNC: 4.2 G/DL (ref 3.5–5.2)
ALP BLD-CCNC: 91 U/L (ref 35–104)
ALT SERPL-CCNC: 17 U/L (ref 0–32)
ANION GAP SERPL CALCULATED.3IONS-SCNC: 10 MMOL/L (ref 7–16)
AST SERPL-CCNC: 28 U/L (ref 0–31)
BASOPHILS ABSOLUTE: 0.05 E9/L (ref 0–0.2)
BASOPHILS RELATIVE PERCENT: 0.6 % (ref 0–2)
BILIRUB SERPL-MCNC: 0.2 MG/DL (ref 0–1.2)
BUN BLDV-MCNC: 17 MG/DL (ref 6–23)
CALCIUM SERPL-MCNC: 9.6 MG/DL (ref 8.6–10.2)
CHLORIDE BLD-SCNC: 100 MMOL/L (ref 98–107)
CHOLESTEROL, TOTAL: 192 MG/DL (ref 0–199)
CO2: 31 MMOL/L (ref 22–29)
CREAT SERPL-MCNC: 0.7 MG/DL (ref 0.5–1)
EOSINOPHILS ABSOLUTE: 0.3 E9/L (ref 0.05–0.5)
EOSINOPHILS RELATIVE PERCENT: 3.8 % (ref 0–6)
GFR AFRICAN AMERICAN: >60
GFR NON-AFRICAN AMERICAN: >60 ML/MIN/1.73
GLUCOSE BLD-MCNC: 86 MG/DL (ref 74–99)
HCT VFR BLD CALC: 42.8 % (ref 34–48)
HDLC SERPL-MCNC: 61 MG/DL
HEMOGLOBIN: 13 G/DL (ref 11.5–15.5)
IMMATURE GRANULOCYTES #: 0.04 E9/L
IMMATURE GRANULOCYTES %: 0.5 % (ref 0–5)
LDL CHOLESTEROL CALCULATED: 102 MG/DL (ref 0–99)
LYMPHOCYTES ABSOLUTE: 2.2 E9/L (ref 1.5–4)
LYMPHOCYTES RELATIVE PERCENT: 28 % (ref 20–42)
MCH RBC QN AUTO: 28.4 PG (ref 26–35)
MCHC RBC AUTO-ENTMCNC: 30.4 % (ref 32–34.5)
MCV RBC AUTO: 93.4 FL (ref 80–99.9)
MONOCYTES ABSOLUTE: 0.74 E9/L (ref 0.1–0.95)
MONOCYTES RELATIVE PERCENT: 9.4 % (ref 2–12)
NEUTROPHILS ABSOLUTE: 4.53 E9/L (ref 1.8–7.3)
NEUTROPHILS RELATIVE PERCENT: 57.7 % (ref 43–80)
PDW BLD-RTO: 15.1 FL (ref 11.5–15)
PLATELET # BLD: 323 E9/L (ref 130–450)
PMV BLD AUTO: 9.9 FL (ref 7–12)
POTASSIUM SERPL-SCNC: 4.9 MMOL/L (ref 3.5–5)
RBC # BLD: 4.58 E12/L (ref 3.5–5.5)
SODIUM BLD-SCNC: 141 MMOL/L (ref 132–146)
TOTAL PROTEIN: 7.5 G/DL (ref 6.4–8.3)
TRIGL SERPL-MCNC: 146 MG/DL (ref 0–149)
VLDLC SERPL CALC-MCNC: 29 MG/DL
WBC # BLD: 7.9 E9/L (ref 4.5–11.5)

## 2021-06-28 PROCEDURE — 99214 OFFICE O/P EST MOD 30 MIN: CPT | Performed by: INTERNAL MEDICINE

## 2021-06-28 PROCEDURE — G0438 PPPS, INITIAL VISIT: HCPCS | Performed by: INTERNAL MEDICINE

## 2021-06-28 RX ORDER — ESTRADIOL 0.05 MG/D
PATCH TRANSDERMAL
Qty: 4 PATCH | Refills: 5 | Status: SHIPPED
Start: 2021-06-28 | End: 2021-11-05 | Stop reason: SDUPTHER

## 2021-06-28 RX ORDER — METOPROLOL SUCCINATE 50 MG/1
TABLET, EXTENDED RELEASE ORAL
Qty: 90 TABLET | Refills: 1 | Status: SHIPPED
Start: 2021-06-28 | End: 2021-11-05 | Stop reason: SDUPTHER

## 2021-06-28 RX ORDER — TRAMADOL HYDROCHLORIDE 50 MG/1
50 TABLET ORAL EVERY 6 HOURS PRN
Qty: 120 TABLET | Refills: 3 | Status: SHIPPED | OUTPATIENT
Start: 2021-06-28 | End: 2021-07-28

## 2021-06-28 RX ORDER — CITALOPRAM 10 MG/1
TABLET ORAL
Qty: 30 TABLET | Refills: 5 | Status: CANCELLED | OUTPATIENT
Start: 2021-06-28

## 2021-06-28 RX ORDER — IPRATROPIUM BROMIDE AND ALBUTEROL SULFATE 2.5; .5 MG/3ML; MG/3ML
1 SOLUTION RESPIRATORY (INHALATION) EVERY 6 HOURS PRN
Qty: 360 ML | Refills: 5 | Status: SHIPPED
Start: 2021-06-28 | End: 2022-03-10 | Stop reason: SDUPTHER

## 2021-06-28 RX ORDER — PANTOPRAZOLE SODIUM 40 MG/1
40 TABLET, DELAYED RELEASE ORAL DAILY
Qty: 30 TABLET | Refills: 5 | Status: SHIPPED
Start: 2021-06-28 | End: 2021-11-05 | Stop reason: SDUPTHER

## 2021-06-28 RX ORDER — CITALOPRAM 20 MG/1
20 TABLET ORAL DAILY
Qty: 90 TABLET | Refills: 1 | Status: SHIPPED
Start: 2021-06-28 | End: 2021-11-05 | Stop reason: SDUPTHER

## 2021-06-28 ASSESSMENT — PATIENT HEALTH QUESTIONNAIRE - PHQ9
SUM OF ALL RESPONSES TO PHQ9 QUESTIONS 1 & 2: 0
SUM OF ALL RESPONSES TO PHQ QUESTIONS 1-9: 0
1. LITTLE INTEREST OR PLEASURE IN DOING THINGS: 0
SUM OF ALL RESPONSES TO PHQ QUESTIONS 1-9: 0
2. FEELING DOWN, DEPRESSED OR HOPELESS: 0
SUM OF ALL RESPONSES TO PHQ QUESTIONS 1-9: 0

## 2021-06-28 ASSESSMENT — LIFESTYLE VARIABLES: HOW OFTEN DO YOU HAVE A DRINK CONTAINING ALCOHOL: 0

## 2021-06-28 NOTE — PROGRESS NOTES
Medicare Annual Wellness Visit  Name: Cl Prescott Date: 2021   MRN: [id number] Sex: Female   Age: 79 y.o. Ethnicity: Non-/Non    : 1940 Race: Erma Roblero is here for Medicare AWV    Screenings for behavioral, psychosocial and functional/safety risks, and cognitive dysfunction are all negative except as indicated below. These results, as well as other patient data from the [address] form, are documented in Flowsheets linked to this Encounter. Allergies   Allergen Reactions    Penicillins     Venlafaxine     Adhesive Tape Rash       Prior to Visit Medications    Medication Sig Taking? Authorizing Provider   calcium carbonate (OYSTER SHELL CALCIUM 500 MG) 1250 (500 Ca) MG tablet Take 500 mg by mouth Yes Historical Provider, MD   calcium carbonate (TUMS) 500 MG chewable tablet Take 500 mg by mouth Yes Historical Provider, MD   traMADol (ULTRAM) 50 MG tablet Take 1 tablet by mouth every 6 hours as needed for Pain for up to 30 days.   Echo Barrera MD   pantoprazole (PROTONIX) 40 MG tablet Take 1 tablet by mouth daily  Echo Barrera MD   metoprolol succinate (TOPROL XL) 50 MG extended release tablet 1 tab daily  Echo Barrera MD   ipratropium-albuterol (DUONEB) 0.5-2.5 (3) MG/3ML SOLN nebulizer solution Inhale 3 mLs into the lungs every 6 hours as needed for Shortness of Breath  Echo Barrera MD   fluticasone-salmeterol (ADVAIR DISKUS) 250-50 MCG/DOSE AEPB Inhale 1 puff into the lungs 2 times daily  Echo Barrera MD   estradiol (CLIMARA) 0.05 MG/24HR PLACE 1 PATCH ONTO THE SKIN ONCE WEEKLY  Echo Barrera MD   citalopram (CELEXA) 20 MG tablet Take 1 tablet by mouth daily  Echo Barrera MD   albuterol sulfate HFA (VENTOLIN HFA) 108 (90 Base) MCG/ACT inhaler Inhale 2 puffs into the lungs every 4-6 hours as needed for Wheezing or Shortness of Breath  Echo Barrera MD       Past Medical History:   Diagnosis Date    Breast cyst     COPD (chronic obstructive pulmonary disease) ([facility] 05/17/2018    hypoxemia    Hypertension     Tobacco abuse     chronic counseling given every visit        Past Surgical History:   Procedure Laterality Date    COLONOSCOPY  04/2008    HYSTERECTOMY, TOTAL ABDOMINAL      KNEE SURGERY      knee replacement 3-2016       No family history on file. CareTeam (Including outside providers/suppliers regularly involved in providing care):   Patient Care Team:  Josue Ivey MD as PCP - Noa Clark MD as PCP - [facility]    Wt Readings from Last 3 Encounters:   06/28/21 139 lb (63 kg)   06/28/21 139 lb (63 kg)   05/18/21 138 lb (62.6 kg)     Vitals:    06/28/21 1426   BP: 124/78   Site: Right Upper Arm   Position: Sitting   Cuff Size: Medium Adult   Pulse: 71   Temp: 97.4 °F (36.3 °C)   TempSrc: Temporal   SpO2: 91%   Weight: 139 lb (63 kg)   Height: 5' 4\" (1.626 m)     Body mass index is 23.86 kg/m². Based upon direct observation of the patient, evaluation of cognition reveals recent and remote memory intact. Patient's complete Health Risk Assessment and screening values have been reviewed and are found in Flowsheets. The following problems were reviewed today and where indicated follow up appointments were made and/or referrals ordered.     Positive Risk Factor Screenings with Interventions:           Health Habits/Nutrition:  Health Habits/Nutrition  Do you exercise for at least 20 minutes 2-3 times per week?: (!) No  Have you lost any weight without trying in the past 3 months?: No  Do you eat only one meal per day?: No  Have you seen the dentist within the past year?: Yes  Body mass index: 23.86  Health Habits/Nutrition Interventions:  · Inadequate physical activity:  patient agrees to increase physical activity as follows: as tolerated       Personalized Preventive Plan   Current Health Maintenance Status  Immunization History   Administered Date(s) Administered    Influenza Vaccine, unspecified formulation 09/07/2011, 09/28/2015    Influenza Virus Vaccine 09/07/2011, 09/28/2015, 09/19/2017    Influenza, Triv, inactivated, subunit, adjuvanted, IM (Fluad 65 yrs and older) 09/20/2018, 09/24/2019, 09/16/2020    Pneumococcal Polysaccharide ([id number]) 09/07/2011        Health Maintenance   Topic Date Due    DTaP/Tdap/Td vaccine (1 - Tdap) Never done    Shingles Vaccine (1 of 2) Never done   [employer] Visit (AWV)  Never done    Potassium monitoring  12/10/2021    Creatinine monitoring  12/10/2021    DEXA (modify frequency per FRAX score)  Completed    Flu vaccine  Completed    Pneumococcal 65+ years Vaccine  Completed    COVID-19 Vaccine  Completed    Hepatitis A vaccine  Aged Out    Hepatitis B vaccine  Aged Out    Hib vaccine  Aged Out    Meningococcal (ACWY) vaccine  Aged Out     Recommendations for [employer] Due: see orders and patient instructions/AVS.  . Recommended screening schedule for the next 5-10 years is provided to the patient in written form: see Patient Carolin Mckeon was seen today for medicare awv.     Diagnoses and all orders for this visit:    Encounter for annual wellness exam in Medicare patient

## 2021-06-28 NOTE — PROGRESS NOTES
Patient began by discussing her 's death. She then became very tearful. She and I discussed this. She has on a relatively low dose of Celexa. We discussed possibly increasing this medication but then discussed also grief counseling. At this point in time she really does not feel she needs that. She does have daughter feel very close by. She is happy that her son who lives in [place] will be visiting in July. Blood pressure here is well controlled. She has had no exacerbations of her COPD and continues not to have symptoms that have worsened. She is chronically on oxygen and satting at 91% on 2 L. Patient denies orthopnea or PND. She has had no increase in peripheral lymphedema. ROS:  Const: General health stated as fair. Eyes: Denies eye symptoms. CV: Reports hypertension. Resp: Reports COPD, chronic hypoxemia. : Severe hot flushing Denies urinary problems. Musculo: Reports arthralgias and arthritis. Breast: Denies breast problems. Endocrine: Denies polydipsia, polyphagia and polyuria. Current Outpatient Medications   Medication Sig Dispense Refill    traMADol (ULTRAM) 50 MG tablet Take 1 tablet by mouth every 6 hours as needed for Pain for up to 30 days.  120 tablet 3    pantoprazole (PROTONIX) 40 MG tablet Take 1 tablet by mouth daily 30 tablet 5    metoprolol succinate (TOPROL XL) 50 MG extended release tablet 1 tab daily 90 tablet 1    ipratropium-albuterol (DUONEB) 0.5-2.5 (3) MG/3ML SOLN nebulizer solution Inhale 3 mLs into the lungs every 6 hours as needed for Shortness of Breath 360 mL 5    fluticasone-salmeterol (ADVAIR DISKUS) 250-50 MCG/DOSE AEPB Inhale 1 puff into the lungs 2 times daily 60 each 3    estradiol (CLIMARA) 0.05 MG/24HR PLACE 1 PATCH ONTO THE SKIN ONCE WEEKLY 4 patch 5    citalopram (CELEXA) 20 MG tablet Take 1 tablet by mouth daily 90 tablet 1    calcium carbonate (OYSTER SHELL CALCIUM 500 MG) 1250 (500 Ca) MG tablet Take 500 mg by mouth  calcium carbonate (TUMS) 500 MG chewable tablet Take 500 mg by mouth      albuterol sulfate HFA (VENTOLIN HFA) 108 (90 Base) MCG/ACT inhaler Inhale 2 puffs into the lungs every 4-6 hours as needed for Wheezing or Shortness of Breath 1 Inhaler 5     No current facility-administered medications for this visit. PMH:  Shot Record:  Methp 40-Methylprednisolone Acetate 40 MG 01/12/19  [id number]-Odfaelo 80MG NDC [address]-51 03/07/11  -Yuanss (Albuterol & Ipratropium) 02/07/19 12/04/18  -Ootm Medrol To 125 MG Injection 02/07/19  -Ppyu Medrol 20MG Injection 11/25/13 06/30/04  90732-Pneumococcal Vaccine (Pneumovax) 09/07/11  90688-Influenza Vaccine- Fluzone Iiv4 Quadrivalent Vial, Ages 3 Yrs + 09/19/17  90658-Influenza Vaccine Fluvirin Iiv3 (ages 3 and older) 09/28/15 09/07/11. Health Maintenance:  Mammogram - (12/2/2015)  Bone Density Test Screening - (5/26/2009)  Mammogram Screening - (12/2/2015)  Colonoscopy - (7/20/2017)  Colonoscopy Screening - (7/20/2017)  Colonoscopy - [address] DX, 2008 DIVERTICULAR DX- 2017 - NEXT 2022  PFT'S - 2004, 9/11 MODERATE TO SEVERE COPD  1/2016-MOD/SEVERE COPD  2D ECHO - 2002  Influenza Vaccination - (2016)  Pneumonia Vaccination - (2011)  Prevnar Vaccine - (2014)  Mammogram Screening - (11/15/2017) slip given  She has had pneumonia in the past.  Also, a questionable pulmonary emboli back in 1994, with no recurrence, of an unidentified source. HTN- GOOD RESPONSE TO TOPROL XL  [id number]-0/17/18- DID IMPROVE WITH BRONCODILATOR-REFUSES O2 THERAPY  CHRONIC TOBACCO ABUSE-COUNSELING GIVEN EVERY VISIT  BREAST CYST- NEED REPEAT US IN SPRING 2014  PNEUMONIA 12.2019 - 2.5cm right hilar node  Depression-7/28/2021 increased Celexa. Discussed possible grief counseling. Surgical Hx:  Total Hysterectomy  Knee Replacement - (3/2016)The patient has had a previous total hysterectomy.   colonoscopy [facility] 4/08 diverticular dx  Reviewed, no changes. FH:  Noncontributory  Reviewed, no changes. SH: Patient is . Personal Habits: Current cigarette smoker, has smoked 1/2 pack daily. NOW VAPOR Drinks alcohol occasionally. Does not exercise. Reviewed, no changes. Date: 04/22/2019  Was the patient queried about smoking behavior? Yes   Does the patient currently smoke? Smoking: Patient is a current smoker, smokes every day - (11/9/2015) VAPOR. Objective  Vitals:    06/28/21 1423   BP: 124/70   Site: Right Upper Arm   Position: Sitting   Cuff Size: Medium Adult   Pulse: 71   Temp: 97.4 °F (36.3 °C)   TempSrc: Temporal   SpO2: 91%   Weight: 139 lb (63 kg)   Height: 5' 4\" (1.626 m)       Exam:  Const: Appears healthy, Appears frail. Eyes: EOMI in both eyes. PERRL. ENMT: External canals are clear and dry. Tympanic membranes are intact. External nose WNL. Neck: Supple and symmetric. Palpation reveals no adenopathy. No masses appreciated. Thyroid exhibits no nodules  or thyromegaly. No JVD. Slight soft enlargement at the top of the sternum. Questionable underlying bony enlargement. Resp: Respirations are unlabored. Respiration rate is normal. Auscultate markedly decreased airflow. Extremely minimal end expiratory wheezing posteriorly. None anteriorly. CV: Rhythm is regular. S1 is normal. S2 is normal. No heart murmur appreciated. Extremities: No clubbing, cyanosis or edema. Musculo: Walks with a normal gait for age. Upper Extremities: Crepitation and limitation with movement of the upper  extremities bilaterally. Lower Extremities: Crepitation and limitation of the lower extremities. Skin: Skin is warm and dry. Neuro: Alert and oriented x3. Mood is normal. Affect is normal. Speech is articulate and fluent. Reflexes: DTR's are  intact, symmetric and 2+ bilaterally. Psych: Patient's attitude is cooperative. Patient's affect is appropriate. Judgement is realistic. Insight is appropriate. [address] was seen today for anxiety.     Diagnoses and all orders for this visit:    Primary osteoarthritis involving multiple joints  -     traMADol (ULTRAM) 50 MG tablet; Take 1 tablet by mouth every 6 hours as needed for Pain for up to 30 days. Essential hypertension  -     metoprolol succinate (TOPROL XL) 50 MG extended release tablet; 1 tab daily    Menopause  -     estradiol (CLIMARA) 0.05 MG/24HR; PLACE 1 PATCH ONTO THE SKIN ONCE WEEKLY    Anxiety    Opioid use, unspecified with unspecified opioid-induced disorder    Opioid dependence with unspecified opioid-induced disorder    Opioid dependence, uncomplicated    Other orders  -     pantoprazole (PROTONIX) 40 MG tablet; Take 1 tablet by mouth daily  -     ipratropium-albuterol (DUONEB) 0.5-2.5 (3) MG/3ML SOLN nebulizer solution; Inhale 3 mLs into the lungs every 6 hours as needed for Shortness of Breath  -     fluticasone-salmeterol (ADVAIR DISKUS) 250-50 MCG/DOSE AEPB; Inhale 1 puff into the lungs 2 times daily  -     citalopram (CELEXA) 20 MG tablet; Take 1 tablet by mouth daily    Dangelo Vee was seen today for anxiety. Diagnoses and all orders for this visit:    Primary osteoarthritis involving multiple joints  -     traMADol (ULTRAM) 50 MG tablet; Take 1 tablet by mouth every 6 hours as needed for Pain for up to 30 days. Essential hypertension  -     metoprolol succinate (TOPROL XL) 50 MG extended release tablet; 1 tab daily    Menopause  -     estradiol (CLIMARA) 0.05 MG/24HR; PLACE 1 PATCH ONTO THE SKIN ONCE WEEKLY    Anxiety    Opioid use, unspecified with unspecified opioid-induced disorder    Opioid dependence with unspecified opioid-induced disorder    Opioid dependence, uncomplicated    Other orders  -     pantoprazole (PROTONIX) 40 MG tablet; Take 1 tablet by mouth daily  -     ipratropium-albuterol (DUONEB) 0.5-2.5 (3) MG/3ML SOLN nebulizer solution;  Inhale 3 mLs into the lungs every 6 hours as needed for Shortness of Breath  -     fluticasone-salmeterol (ADVAIR DISKUS) 250-50 MCG/DOSE

## 2021-07-19 DIAGNOSIS — F51.01 PRIMARY INSOMNIA: Primary | ICD-10-CM

## 2021-07-19 DIAGNOSIS — J43.2 CENTRILOBULAR EMPHYSEMA (HCC): ICD-10-CM

## 2021-07-19 RX ORDER — ALPRAZOLAM 0.25 MG/1
0.25 TABLET ORAL NIGHTLY PRN
Qty: 30 TABLET | Refills: 5 | Status: SHIPPED
Start: 2021-07-19 | End: 2022-02-08 | Stop reason: SDUPTHER

## 2021-07-19 RX ORDER — ALBUTEROL SULFATE 90 UG/1
2 AEROSOL, METERED RESPIRATORY (INHALATION)
Qty: 1 INHALER | Refills: 5 | Status: SHIPPED
Start: 2021-07-19 | End: 2021-11-05 | Stop reason: SDUPTHER

## 2021-07-19 NOTE — TELEPHONE ENCOUNTER
Last Appointment:  6/28/2021  Future Appointments   Date Time Provider Emily Gamez   11/1/2021 11:30 AM Willis Callejas  [facility]

## 2021-11-05 ENCOUNTER — OFFICE VISIT (OUTPATIENT)
Dept: PRIMARY CARE CLINIC | Age: 81
End: 2021-11-05
Payer: MEDICARE

## 2021-11-05 VITALS
BODY MASS INDEX: 24.75 KG/M2 | RESPIRATION RATE: 16 BRPM | HEART RATE: 76 BPM | WEIGHT: 145 LBS | OXYGEN SATURATION: 96 % | SYSTOLIC BLOOD PRESSURE: 136 MMHG | DIASTOLIC BLOOD PRESSURE: 80 MMHG | TEMPERATURE: 97.5 F | HEIGHT: 64 IN

## 2021-11-05 DIAGNOSIS — M15.9 PRIMARY OSTEOARTHRITIS INVOLVING MULTIPLE JOINTS: ICD-10-CM

## 2021-11-05 DIAGNOSIS — I10 ESSENTIAL HYPERTENSION: ICD-10-CM

## 2021-11-05 DIAGNOSIS — Z78.0 MENOPAUSE: ICD-10-CM

## 2021-11-05 DIAGNOSIS — J43.2 CENTRILOBULAR EMPHYSEMA (HCC): Primary | ICD-10-CM

## 2021-11-05 DIAGNOSIS — Z23 INFLUENZA VACCINE NEEDED: ICD-10-CM

## 2021-11-05 DIAGNOSIS — F41.9 ANXIETY: ICD-10-CM

## 2021-11-05 PROCEDURE — 99213 OFFICE O/P EST LOW 20 MIN: CPT | Performed by: NURSE PRACTITIONER

## 2021-11-05 RX ORDER — ESTRADIOL 0.05 MG/D
PATCH TRANSDERMAL
Qty: 4 PATCH | Refills: 5 | Status: SHIPPED
Start: 2021-11-05 | End: 2022-03-10 | Stop reason: SDUPTHER

## 2021-11-05 RX ORDER — METOPROLOL SUCCINATE 50 MG/1
TABLET, EXTENDED RELEASE ORAL
Qty: 90 TABLET | Refills: 1 | Status: SHIPPED
Start: 2021-11-05 | End: 2022-03-10 | Stop reason: SDUPTHER

## 2021-11-05 RX ORDER — PANTOPRAZOLE SODIUM 40 MG/1
40 TABLET, DELAYED RELEASE ORAL DAILY
Qty: 30 TABLET | Refills: 5 | Status: SHIPPED
Start: 2021-11-05 | End: 2022-03-10 | Stop reason: SDUPTHER

## 2021-11-05 RX ORDER — TRAMADOL HYDROCHLORIDE 50 MG/1
50 TABLET ORAL EVERY 6 HOURS PRN
Qty: 120 TABLET | Refills: 0 | Status: SHIPPED
Start: 2021-11-05 | End: 2021-12-01 | Stop reason: SDUPTHER

## 2021-11-05 RX ORDER — ALBUTEROL SULFATE 90 UG/1
2 AEROSOL, METERED RESPIRATORY (INHALATION)
Qty: 1 EACH | Refills: 3 | Status: SHIPPED
Start: 2021-11-05 | End: 2022-03-10 | Stop reason: SDUPTHER

## 2021-11-05 RX ORDER — TRAMADOL HYDROCHLORIDE 50 MG/1
50 TABLET ORAL EVERY 6 HOURS PRN
COMMUNITY
Start: 2021-10-21 | End: 2021-11-05 | Stop reason: SDUPTHER

## 2021-11-05 RX ORDER — CITALOPRAM 20 MG/1
20 TABLET ORAL DAILY
Qty: 90 TABLET | Refills: 1 | Status: SHIPPED
Start: 2021-11-05 | End: 2022-03-10 | Stop reason: SDUPTHER

## 2021-11-05 NOTE — PROGRESS NOTES
CC: Patient presents for follow-up. HPI: Patient states that the increase in the Celexa has been significantly helpful for depression. We again discussed grief counseling but she does not feel this is necessary at this time. She will be due for a CT scan in the spring of next year to monitor the aortic aneurysm. She has not had any COPD exacerbations. We discussed immunizations, and influenza vaccine will be given today. ROS:  Const: General health stated as fair. Eyes: Denies eye symptoms. CV: Reports hypertension. Resp: Reports COPD, chronic hypoxemia. : Severe hot flushing Denies urinary problems. Musculo: Reports arthralgias and arthritis. Breast: Denies breast problems. Endocrine: Denies polydipsia, polyphagia and polyuria. Current Outpatient Medications   Medication Sig Dispense Refill    traMADol (ULTRAM) 50 MG tablet Take 50 mg by mouth every 6 hours as needed.  fluticasone-salmeterol (ADVAIR DISKUS) 250-50 MCG/DOSE AEPB Inhale 1 puff into the lungs 2 times daily 60 each 3    pantoprazole (PROTONIX) 40 MG tablet Take 1 tablet by mouth daily 30 tablet 5    metoprolol succinate (TOPROL XL) 50 MG extended release tablet 1 tab daily 90 tablet 1    ipratropium-albuterol (DUONEB) 0.5-2.5 (3) MG/3ML SOLN nebulizer solution Inhale 3 mLs into the lungs every 6 hours as needed for Shortness of Breath 360 mL 5    estradiol (CLIMARA) 0.05 MG/24HR PLACE 1 PATCH ONTO THE SKIN ONCE WEEKLY 4 patch 5    citalopram (CELEXA) 20 MG tablet Take 1 tablet by mouth daily 90 tablet 1    calcium carbonate (OYSTER SHELL CALCIUM 500 MG) 1250 (500 Ca) MG tablet Take 500 mg by mouth      calcium carbonate (TUMS) 500 MG chewable tablet Take 500 mg by mouth      albuterol sulfate HFA (VENTOLIN HFA) 108 (90 Base) MCG/ACT inhaler Inhale 2 puffs into the lungs every 4-6 hours as needed for Wheezing or Shortness of Breath 1 Inhaler 5     No current facility-administered medications for this visit. PMH:  Shot Record:  Methp 40-Methylprednisolone Acetate 40 MG 01/12/19  [id number]-Wgaavxa 80MG NDC [address]-44 03/07/11  -Zhxivz (Albuterol & Ipratropium) 02/07/19 12/04/18  -Pszx Medrol To 125 MG Injection 02/07/19  -Zcpk Medrol 20MG Injection 11/25/13 06/30/04  90732-Pneumococcal Vaccine (Pneumovax) 09/07/11  90688-Influenza Vaccine- Fluzone Iiv4 Quadrivalent Vial, Ages 3 Yrs + 09/19/17  90658-Influenza Vaccine Fluvirin Iiv3 (ages 3 and older) 09/28/15 09/07/11. Health Maintenance:  Mammogram - (12/2/2015)  Bone Density Test Screening - (5/26/2009)  Mammogram Screening - (12/2/2015)  Colonoscopy - (7/20/2017)  Colonoscopy Screening - (7/20/2017)  Colonoscopy - [address] DX, 2008 DIVERTICULAR DX- 2017 - NEXT 2022  PFT'S - 2004, 9/11 MODERATE TO SEVERE COPD  1/2016-MOD/SEVERE COPD  2D ECHO - 2002  Influenza Vaccination - (2016)  Pneumonia Vaccination - (2011)  Prevnar Vaccine - (2014)  Mammogram Screening - (11/15/2017) slip given  She has had pneumonia in the past.  Also, a questionable pulmonary emboli back in 1994, with no recurrence, of an unidentified source. HTN- GOOD RESPONSE TO TOPROL XL  SLEO-PUUHTHHWL-4/17/18- DID IMPROVE WITH BRONCODILATOR-REFUSES O2 THERAPY  CHRONIC TOBACCO ABUSE-COUNSELING GIVEN EVERY VISIT  BREAST CYST- NEED REPEAT US IN SPRING 2014  PNEUMONIA 12.2019 - 2.5cm right hilar node  Depression-7/28/2021 increased Celexa. Discussed possible grief counseling. Surgical Hx:  Total Hysterectomy  Knee Replacement - (3/2016)The patient has had a previous total hysterectomy. colonoscopy sankovic 4/08 diverticular dx  Reviewed, no changes. FH:  Noncontributory  Reviewed, no changes. SH: Patient is . Personal Habits: Current cigarette smoker, has smoked 1/2 pack daily. NOW VAPOR Drinks alcohol occasionally. Does not exercise. Reviewed, no changes. Date: 04/22/2019  Was the patient queried about smoking behavior?  Yes   Does the patient currently smoke? Smoking: Patient is a current smoker, smokes every day - (11/9/2015) VAPOR. Objective  Vitals:    11/05/21 1409   BP: 136/80   Pulse: 76   Resp: 16   Temp: 97.5 °F (36.4 °C)   SpO2: 96%   Weight: 145 lb (65.8 kg)   Height: 5' 4\" (1.626 m)       Exam:  Const: Appears healthy, Appears frail. Eyes: EOMI in both eyes. PERRL. ENMT: External canals are clear and dry. Tympanic membranes are intact. External nose WNL. Neck: Supple and symmetric. Palpation reveals no adenopathy. No masses appreciated. Thyroid exhibits no nodules  or thyromegaly. No JVD. Slight soft enlargement at the top of the sternum. Questionable underlying bony enlargement. Resp: Respirations are unlabored. Respiration rate is normal. Auscultate markedly decreased airflow. Extremely minimal end expiratory wheezing posteriorly. None anteriorly. CV: Rhythm is regular. S1 is normal. S2 is normal. No heart murmur appreciated. Extremities: No clubbing, cyanosis or edema. Musculo: Walks with a normal gait for age. Upper Extremities: Crepitation and limitation with movement of the upper  extremities bilaterally. Lower Extremities: Crepitation and limitation of the lower extremities. Skin: Skin is warm and dry. Neuro: Alert and oriented x3. Mood is normal. Affect is normal. Speech is articulate and fluent. Reflexes: DTR's are  intact, symmetric and 2+ bilaterally. Psych: Patient's attitude is cooperative. Patient's affect is appropriate. Judgement is realistic. Insight is appropriate. Erasto Hebert was seen today for hypertension. Diagnoses and all orders for this visit:    Centrilobular emphysema ([facility] [address]    Primary osteoarthritis involving multiple joints    Anxiety    Influenza vaccine needed  -     INFLUENZA, QUADV, ADJUVANTED, 65 YRS =, IM, PF, PREFILL SYR, 0.5ML (FLUAD)    Essential hypertension    Menopause    Erasto Hebert was seen today for hypertension.     Diagnoses and all orders for this visit:    Centrilobular emphysema

## 2021-12-01 DIAGNOSIS — M15.9 PRIMARY OSTEOARTHRITIS INVOLVING MULTIPLE JOINTS: ICD-10-CM

## 2021-12-01 RX ORDER — TRAMADOL HYDROCHLORIDE 50 MG/1
50 TABLET ORAL EVERY 6 HOURS PRN
Qty: 120 TABLET | Refills: 3 | Status: SHIPPED | OUTPATIENT
Start: 2021-12-01 | End: 2021-12-31

## 2021-12-01 NOTE — TELEPHONE ENCOUNTER
Last Appointment:  6/28/2021  Future Appointments   Date Time Provider Emily Gamez   3/10/2022  2:15 PM Bar Cabrera  [facility]

## 2021-12-01 NOTE — TELEPHONE ENCOUNTER
----- Message from Misti Ayon sent at 12/1/2021 10:47 AM EST -----  Subject: Refill Request    QUESTIONS  Name of Medication? traMADol (ULTRAM) 50 MG tablet  Patient-reported dosage and instructions? 50 mg, 4 times a day as needed  How many days do you have left? 0  Preferred Pharmacy? [facility] [address]  Pharmacy phone number (if available)? [phone]  ---------------------------------------------------------------------------  --------------  CALL BACK INFO  What is the best way for the office to contact you? OK to leave message on   voicemail  Preferred Call Back Phone Number?  [phone]

## 2022-01-27 ENCOUNTER — OFFICE VISIT (OUTPATIENT)
Dept: PRIMARY CARE CLINIC | Age: 82
End: 2022-01-27
Payer: MEDICARE

## 2022-01-27 VITALS
RESPIRATION RATE: 16 BRPM | OXYGEN SATURATION: 90 % | DIASTOLIC BLOOD PRESSURE: 80 MMHG | SYSTOLIC BLOOD PRESSURE: 138 MMHG | HEIGHT: 64 IN | HEART RATE: 82 BPM | TEMPERATURE: 97.6 F | BODY MASS INDEX: 24.41 KG/M2 | WEIGHT: 143 LBS

## 2022-01-27 DIAGNOSIS — I10 ESSENTIAL HYPERTENSION: ICD-10-CM

## 2022-01-27 DIAGNOSIS — M15.9 PRIMARY OSTEOARTHRITIS INVOLVING MULTIPLE JOINTS: ICD-10-CM

## 2022-01-27 DIAGNOSIS — J43.2 CENTRILOBULAR EMPHYSEMA (HCC): ICD-10-CM

## 2022-01-27 DIAGNOSIS — J18.9 PNEUMONIA OF BOTH LUNGS DUE TO INFECTIOUS ORGANISM, UNSPECIFIED PART OF LUNG: Primary | ICD-10-CM

## 2022-01-27 PROCEDURE — 1111F DSCHRG MED/CURRENT MED MERGE: CPT | Performed by: NURSE PRACTITIONER

## 2022-01-27 PROCEDURE — 99213 OFFICE O/P EST LOW 20 MIN: CPT | Performed by: NURSE PRACTITIONER

## 2022-01-27 RX ORDER — L.ACIDOPH/B.ANIMALIS/B.LONGUM 15B CELL
CAPSULE ORAL
COMMUNITY
Start: 2022-01-20

## 2022-01-27 RX ORDER — FAMOTIDINE 20 MG/1
20 TABLET, FILM COATED ORAL DAILY
COMMUNITY
Start: 2022-01-20 | End: 2022-08-01

## 2022-01-27 RX ORDER — PREDNISONE 10 MG/1
TABLET ORAL
COMMUNITY
Start: 2022-01-20 | End: 2022-03-10

## 2022-01-27 NOTE — PROGRESS NOTES
CC: Patient presents for follow-up from hospitalization. HPI: Patient was recently hospitalized with pneumonia. COVID-19 testing was negative. The patient was given IV antibiotics, and after improvement in oxygen saturations, was discharged home on prednisone, cefdinir, and a azithromycin. In terms of her respiratory status, the patient reports she is about back to baseline. She is currently on 3 L of oxygen which was typical prior to hospitalization, she states pulse oxes have remained in the 90s. The patient does report some back pain not started after discharge of the hospital.  The patient states she has had this previously with prednisone use and should finish the prednisone in the next few days. She does not have any red flag symptomatology. ROS:  Const: General health stated as fair. Eyes: Denies eye symptoms. CV: Reports hypertension. Resp: Reports COPD, chronic hypoxemia. : Severe hot flushing Denies urinary problems. Musculo: Reports arthralgias and arthritis. Breast: Denies breast problems. Endocrine: Denies polydipsia, polyphagia and polyuria.     Current Outpatient Medications   Medication Sig Dispense Refill    Probiotic Product (FLORAJEN3) CAPS TAKE 1 CAPSULE BY MOUTH TWICE DAILY      predniSONE (DELTASONE) 10 MG tablet TAKE 4 TABLETS BY MOUTH ONCE DAILY FOR 3 DAYS THEN 3 ONCE DAILY FOR 3 DAYS THEN 2 ONCE DAILY FOR 3 DAYS THEN 1 ONCE DAILY FOR 3 DAYS      famotidine (PEPCID) 20 MG tablet Take 20 mg by mouth daily      pantoprazole (PROTONIX) 40 MG tablet Take 1 tablet by mouth daily 30 tablet 5    metoprolol succinate (TOPROL XL) 50 MG extended release tablet 1 tab daily 90 tablet 1    fluticasone-salmeterol (ADVAIR DISKUS) 250-50 MCG/DOSE AEPB Inhale 1 puff into the lungs 2 times daily 60 each 3    estradiol (CLIMARA) 0.05 MG/24HR PLACE 1 PATCH ONTO THE SKIN ONCE WEEKLY 4 patch 5    citalopram (CELEXA) 20 MG tablet Take 1 tablet by mouth daily 90 tablet 1    ipratropium-albuterol (DUONEB) 0.5-2.5 (3) MG/3ML SOLN nebulizer solution Inhale 3 mLs into the lungs every 6 hours as needed for Shortness of Breath 360 mL 5    calcium carbonate (OYSTER SHELL CALCIUM 500 MG) 1250 (500 Ca) MG tablet Take 500 mg by mouth      calcium carbonate (TUMS) 500 MG chewable tablet Take 500 mg by mouth      albuterol sulfate HFA (VENTOLIN HFA) 108 (90 Base) MCG/ACT inhaler Inhale 2 puffs into the lungs every 4-6 hours as needed for Wheezing or Shortness of Breath 1 each 3     No current facility-administered medications for this visit. PMH:  Shot Record:  Methp 40-Methylprednisolone Acetate 40 MG 01/12/19  Phie60-Xjozemd 80MG NDC [id number] 03/07/11  -Iusbap (Albuterol & Ipratropium) 02/07/19 12/04/18  -Yyha Medrol To 125 MG Injection 02/07/19  -Sils Medrol 20MG Injection 11/25/13 06/30/04  90732-Pneumococcal Vaccine (Pneumovax) 09/07/11  90688-Influenza Vaccine- Fluzone Iiv4 Quadrivalent Vial, Ages 3 Yrs + 09/19/17  90658-Influenza Vaccine Fluvirin Iiv3 (ages 3 and older) 09/28/15 09/07/11. Health Maintenance:  Mammogram - (12/2/2015)  Bone Density Test Screening - (5/26/2009)  Mammogram Screening - (12/2/2015)  Colonoscopy - (7/20/2017)  Colonoscopy Screening - (7/20/2017)  Colonoscopy - [address] DX, 2008 DIVERTICULAR DX- 2017 - NEXT 2022  PFT'S - 2004, 9/11 MODERATE TO SEVERE COPD  1/2016-MOD/SEVERE COPD  2D ECHO - 2002  Influenza Vaccination - (2016)  Pneumonia Vaccination - (2011)  Prevnar Vaccine - (2014)  Mammogram Screening - (11/15/2017) slip given  She has had pneumonia in the past.  Also, a questionable pulmonary emboli back in 1994, with no recurrence, of an unidentified source.   HTN- GOOD RESPONSE TO TOPROL XL  [id number]-5/17/18- DID IMPROVE WITH BRONCODILATOR-REFUSES O2 THERAPY  CHRONIC TOBACCO ABUSE-COUNSELING GIVEN EVERY VISIT  BREAST CYST- NEED REPEAT US IN SPRING 2014  PNEUMONIA 12.2019 - 2.5cm right hilar node  Depression-7/28/2021 increased Celexa. Discussed possible grief counseling. Surgical Hx:  Total Hysterectomy  Knee Replacement - (3/2016)The patient has had a previous total hysterectomy. colonoscopy sankovic 4/08 diverticular dx  Reviewed, no changes. FH:  Noncontributory  Reviewed, no changes. SH: Patient is . Personal Habits: Current cigarette smoker, has smoked 1/2 pack daily. NOW VAPOR Drinks alcohol occasionally. Does not exercise. Reviewed, no changes. Date: 04/22/2019  Was the patient queried about smoking behavior? Yes   Does the patient currently smoke? Smoking: Patient is a current smoker, smokes every day - (11/9/2015) VAPOR. Objective  Vitals:    01/27/22 1045   BP: 138/80   Pulse: 82   Resp: 16   Temp: 97.6 °F (36.4 °C)   SpO2: 90%   Weight: 143 lb (64.9 kg)   Height: 5' 4\" (1.626 m)       Exam:  Const: Appears healthy, Appears frail. Eyes: EOMI in both eyes. PERRL. ENMT: External canals are clear and dry. Tympanic membranes are intact. External nose WNL. Neck: Supple and symmetric. Palpation reveals no adenopathy. No masses appreciated. Thyroid exhibits no nodules  or thyromegaly. No JVD. Slight soft enlargement at the top of the sternum. Questionable underlying bony enlargement. Resp: Respirations are unlabored. Respiration rate is normal. Auscultate markedly decreased airflow. Auscultate breath sounds in bilateral bases most consistent with fine crackles. CV: Rhythm is regular. S1 is normal. S2 is normal. No heart murmur appreciated. Extremities: No clubbing, cyanosis or edema. Musculo: Walks with a normal gait for age, slightly antalgic due to low back discomfort. Upper Extremities: Crepitation and limitation with movement of the upper  extremities bilaterally. Lower Extremities: Crepitation and limitation of the lower extremities. There is no tenderness over the lumbar spine.   There is some mild tenderness over the paraspinal lumbar musculature bilaterally. Skin: Skin is warm and dry. Neuro: Alert and oriented x3. Mood is normal. Affect is normal. Speech is articulate and fluent. Psych: Patient's attitude is cooperative. Patient's affect is appropriate. Judgement is realistic. Insight is appropriate. Janneth Davenport was seen today for pneumonia. Diagnoses and all orders for this visit:    Pneumonia of both lungs due to infectious organism, unspecified part of lung  -     XR CHEST (2 VW); Future    Centrilobular emphysema (HCC)    Primary osteoarthritis involving multiple joints    Essential hypertension      Repeat chest x-ray will be ordered to be done prior to her next office visit. The patient and I discussed the back pain, which she states is very similar to previous use with the prednisone. If this does not improve over the next few days, or if it worsens in the next few days, the patient is to let me know and I will order plain films of the lumbar spine. She will otherwise be seen back as scheduled in early March.

## 2022-02-08 ENCOUNTER — TELEPHONE (OUTPATIENT)
Dept: FAMILY MEDICINE CLINIC | Age: 82
End: 2022-02-08

## 2022-02-08 DIAGNOSIS — F51.01 PRIMARY INSOMNIA: ICD-10-CM

## 2022-02-08 RX ORDER — ALPRAZOLAM 0.25 MG/1
0.25 TABLET ORAL NIGHTLY PRN
Qty: 30 TABLET | Refills: 5 | Status: SHIPPED
Start: 2022-02-08 | End: 2022-03-25 | Stop reason: SDUPTHER

## 2022-02-08 NOTE — TELEPHONE ENCOUNTER
Daughter - Phil Castellanos is calling to ask for a new script for Alprazolam to be sent to [facility] in [facility]

## 2022-02-14 ENCOUNTER — TELEPHONE (OUTPATIENT)
Dept: FAMILY MEDICINE CLINIC | Age: 82
End: 2022-02-14

## 2022-02-14 NOTE — TELEPHONE ENCOUNTER
Pt called in and said she has finished her script for her pneumonia but she is still short of breath and feels it is not all the way gone. She said she is too sick/short of breath to come in for an appt. And was asking if you could send in a script for her to the pharmacy.  ([facility] in [place]) Please Advise

## 2022-02-14 NOTE — TELEPHONE ENCOUNTER
Patient has severe COPD. She either needs to be seen directly or by the pulmonologist to document whether her infection is cleared. My suspicion is that it is her lung disease and not an infection that is causing her prolonged dyspnea.

## 2022-02-15 ENCOUNTER — TELEPHONE (OUTPATIENT)
Dept: PRIMARY CARE CLINIC | Age: 82
End: 2022-02-15

## 2022-02-15 NOTE — TELEPHONE ENCOUNTER
----- Message from Sugey Mccarty sent at 2/15/2022  1:03 PM EST -----  Subject: Message to Provider    QUESTIONS  Information for Provider? The pts daughter, eYsenia Jorgensen would like a call   back from Dr. Chely Young to discuss the pt being too short of breath to   go to get the chest x-ray that she was told to do and pt refusing to go to   hospital. She would also like to try to get the pt antibiotics until she   can be seen. ---------------------------------------------------------------------------  --------------  Roberta SPENCER  What is the best way for the office to contact you? OK to leave message on   voicemail  Preferred Call Back Phone Number? [phone]  ---------------------------------------------------------------------------  --------------  SCRIPT ANSWERS  Relationship to Patient? Other  Representative Name? Yesenia Jorgensen  Is the Representative on the appropriate HIPAA document in Epic?  Yes

## 2022-02-15 NOTE — TELEPHONE ENCOUNTER
Called pt to inform. Pt said she will not be able to see a doctor, she is too short of breath to get ready and cannot even get a shower.

## 2022-03-10 ENCOUNTER — TELEPHONE (OUTPATIENT)
Dept: PRIMARY CARE CLINIC | Age: 82
End: 2022-03-10

## 2022-03-10 ENCOUNTER — OFFICE VISIT (OUTPATIENT)
Dept: PRIMARY CARE CLINIC | Age: 82
End: 2022-03-10
Payer: MEDICARE

## 2022-03-10 VITALS
TEMPERATURE: 97.8 F | SYSTOLIC BLOOD PRESSURE: 100 MMHG | HEART RATE: 96 BPM | OXYGEN SATURATION: 89 % | WEIGHT: 141 LBS | DIASTOLIC BLOOD PRESSURE: 60 MMHG | BODY MASS INDEX: 24.2 KG/M2

## 2022-03-10 DIAGNOSIS — J43.2 CENTRILOBULAR EMPHYSEMA (HCC): ICD-10-CM

## 2022-03-10 DIAGNOSIS — Z78.0 MENOPAUSE: ICD-10-CM

## 2022-03-10 DIAGNOSIS — I10 ESSENTIAL HYPERTENSION: ICD-10-CM

## 2022-03-10 DIAGNOSIS — F32.9 REACTIVE DEPRESSION: Primary | ICD-10-CM

## 2022-03-10 PROBLEM — F11.29 OPIOID DEPENDENCE WITH UNSPECIFIED OPIOID-INDUCED DISORDER (HCC): Status: RESOLVED | Noted: 2021-06-28 | Resolved: 2022-01-01

## 2022-03-10 PROBLEM — F11.20 OPIOID DEPENDENCE, UNCOMPLICATED (HCC): Status: RESOLVED | Noted: 2021-06-28 | Resolved: 2022-01-01

## 2022-03-10 PROBLEM — J44.1 COPD WITH ACUTE EXACERBATION (HCC): Status: ACTIVE | Noted: 2019-05-09

## 2022-03-10 PROCEDURE — 99214 OFFICE O/P EST MOD 30 MIN: CPT | Performed by: INTERNAL MEDICINE

## 2022-03-10 RX ORDER — PREDNISONE 20 MG/1
60 TABLET ORAL DAILY
Qty: 30 TABLET | Refills: 0 | Status: SHIPPED | OUTPATIENT
Start: 2022-03-10 | End: 2022-03-20

## 2022-03-10 RX ORDER — ALBUTEROL SULFATE 90 UG/1
2 AEROSOL, METERED RESPIRATORY (INHALATION)
Qty: 1 EACH | Refills: 3 | Status: SHIPPED
Start: 2022-03-10 | End: 2022-06-30 | Stop reason: SDUPTHER

## 2022-03-10 RX ORDER — TIOTROPIUM BROMIDE 18 UG/1
18 CAPSULE ORAL; RESPIRATORY (INHALATION) DAILY
Qty: 30 CAPSULE | Refills: 5 | Status: SHIPPED | OUTPATIENT
Start: 2022-03-10

## 2022-03-10 RX ORDER — TRAMADOL HYDROCHLORIDE 50 MG/1
TABLET ORAL
COMMUNITY
Start: 2022-03-01 | End: 2022-03-25 | Stop reason: SDUPTHER

## 2022-03-10 RX ORDER — CITALOPRAM 20 MG/1
20 TABLET ORAL DAILY
Qty: 90 TABLET | Refills: 1 | Status: SHIPPED | OUTPATIENT
Start: 2022-03-10

## 2022-03-10 RX ORDER — PREDNISONE 20 MG/1
TABLET ORAL
COMMUNITY
Start: 2022-02-16 | End: 2022-04-04

## 2022-03-10 RX ORDER — ESTRADIOL 0.05 MG/D
PATCH TRANSDERMAL
Qty: 4 PATCH | Refills: 5 | Status: SHIPPED
Start: 2022-03-10 | End: 2022-08-01

## 2022-03-10 RX ORDER — IPRATROPIUM BROMIDE AND ALBUTEROL SULFATE 2.5; .5 MG/3ML; MG/3ML
1 SOLUTION RESPIRATORY (INHALATION) EVERY 6 HOURS PRN
Qty: 360 ML | Refills: 5 | Status: SHIPPED
Start: 2022-03-10 | End: 2022-07-01 | Stop reason: SDUPTHER

## 2022-03-10 RX ORDER — PANTOPRAZOLE SODIUM 40 MG/1
40 TABLET, DELAYED RELEASE ORAL DAILY
Qty: 30 TABLET | Refills: 5 | Status: SHIPPED | OUTPATIENT
Start: 2022-03-10

## 2022-03-10 RX ORDER — METOPROLOL SUCCINATE 50 MG/1
TABLET, EXTENDED RELEASE ORAL
Qty: 90 TABLET | Refills: 1 | Status: SHIPPED | OUTPATIENT
Start: 2022-03-10

## 2022-03-10 RX ORDER — OXYCODONE HYDROCHLORIDE AND ACETAMINOPHEN 5; 325 MG/1; MG/1
TABLET ORAL
COMMUNITY
Start: 2021-12-10 | End: 2022-08-01

## 2022-03-10 NOTE — PROGRESS NOTES
Patient has situational depression. This seems to be stable but she is really struggled over the last several months with her breathing. She was hospitalized with pneumonia back in January. She was seen posthospitalization by [facility]. She was doing better at that time but she was still on steroid. Over this last month she is called several times saying she was so short of breath that she could not even go to the emergency room. She did finally go to the emergency room on February 15 and I discharged her to home. I did review the lab work and also the x-ray that was done at that time. The patient will need follow-up x-ray once her exacerbation of COPD is taken care of. Patient medications will need to be adjusted. I will place her on a larger dose of Advair, begin Spiriva because she rarely uses DuoNeb, have her increase her albuterol inhaler to every 4 hours at home. Patient states that she is not having pleuritic chest pain. She denies any palpitations. Her CT angiography done back in January was negative for pulmonary embolism. ROS:  Const: General health stated as fair. Eyes: Denies eye symptoms. CV: Reports hypertension. Resp: Reports COPD, chronic hypoxemia. Acute exacerbation. : Severe hot flushing Denies urinary problems. Musculo: Reports arthralgias and arthritis. Breast: Denies breast problems. Endocrine: Denies polydipsia, polyphagia and polyuria.   Current Outpatient Medications   Medication Sig Dispense Refill    oxyCODONE-acetaminophen (PERCOCET) 5-325 MG per tablet TAKE 1 TABLET BY MOUTH EVERY 8 HOURS AS NEEDED FOR PAIN FOR 5 DAYS      traMADol (ULTRAM) 50 MG tablet TAKE 1 TABLET BY MOUTH EVERY 6 HOURS AS NEEDED FOR PAIN FOR UP TO 30 DAYS      predniSONE (DELTASONE) 20 MG tablet TAKE 3 TABLETS BY MOUTH ONCE DAILY      albuterol sulfate HFA (VENTOLIN HFA) 108 (90 Base) MCG/ACT inhaler Inhale 2 puffs into the lungs every 4-6 hours as needed for Wheezing or Shortness of Breath 1 each 3    citalopram (CELEXA) 20 MG tablet Take 1 tablet by mouth daily 90 tablet 1    estradiol (CLIMARA) 0.05 MG/24HR PLACE 1 PATCH ONTO THE SKIN ONCE WEEKLY 4 patch 5    ipratropium-albuterol (DUONEB) 0.5-2.5 (3) MG/3ML SOLN nebulizer solution Inhale 3 mLs into the lungs every 6 hours as needed for Shortness of Breath 360 mL 5    metoprolol succinate (TOPROL XL) 50 MG extended release tablet 1 tab daily 90 tablet 1    pantoprazole (PROTONIX) 40 MG tablet Take 1 tablet by mouth daily 30 tablet 5    ADVAIR DISKUS 500-50 MCG/DOSE diskus inhaler Inhale 1 puff into the lungs every 12 hours 60 each 3    tiotropium (SPIRIVA HANDIHALER) 18 MCG inhalation capsule Inhale 1 capsule into the lungs daily 30 capsule 5    predniSONE (DELTASONE) 20 MG tablet Take 3 tablets by mouth daily for 10 days 30 tablet 0    ALPRAZolam (XANAX) 0.25 MG tablet Take 1 tablet by mouth nightly as needed for Sleep for up to 30 days. 30 tablet 5    Probiotic Product (FLORAJEN3) CAPS TAKE 1 CAPSULE BY MOUTH TWICE DAILY      famotidine (PEPCID) 20 MG tablet Take 20 mg by mouth daily      calcium carbonate (OYSTER SHELL CALCIUM 500 MG) 1250 (500 Ca) MG tablet Take 500 mg by mouth      calcium carbonate (TUMS) 500 MG chewable tablet Take 500 mg by mouth       No current facility-administered medications for this visit. PMH:  Shot Record:  Methp 40-Methylprednisolone Acetate 40 MG 01/12/19  Iahq53-Ounxcwh 80MG NDC [address]-08 03/07/11  -Jnocpg (Albuterol & Ipratropium) 02/07/19 12/04/18  -Drxv Medrol To 125 MG Injection 02/07/19  -Gfpv Medrol 20MG Injection 11/25/13 06/30/04  90732-Pneumococcal Vaccine (Pneumovax) 09/07/11  90688-Influenza Vaccine- Fluzone Iiv4 Quadrivalent Vial, Ages 3 Yrs + 09/19/17  90658-Influenza Vaccine Fluvirin Iiv3 (ages 3 and older) 09/28/15 09/07/11.   Health Maintenance:  Mammogram - (12/2/2015)  Bone Density Test Screening - (5/26/2009)  Mammogram Screening - (12/2/2015)  Colonoscopy - (7/20/2017)  Colonoscopy Screening - (7/20/2017)  Colonoscopy - [address] DX, 2008 DIVERTICULAR DX- 2017 - NEXT 2022  PFT'S - 2004, 9/11 MODERATE TO SEVERE COPD  1/2016-MOD/SEVERE COPD  2D ECHO - 2002  Influenza Vaccination - (2016)  Pneumonia Vaccination - (2011)  Prevnar Vaccine - (2014)  Mammogram Screening - (11/15/2017) slip given  She has had pneumonia in the past.  Also, a questionable pulmonary emboli back in 1994, with no recurrence, of an unidentified source. HTN- GOOD RESPONSE TO TOPROL XL  DMNF-WTKBGIMKA-3/17/18- DID IMPROVE WITH BRONCODILATOR-REFUSES O2 THERAPY  CHRONIC TOBACCO ABUSE-COUNSELING GIVEN EVERY VISIT  BREAST CYST- NEED REPEAT US IN SPRING 2014  PNEUMONIA 12.2019 - 2.5cm right hilar node  Depression-7/28/2021 increased Celexa. Discussed possible grief counseling. Surgical Hx:  Total Hysterectomy  Knee Replacement - (3/2016)The patient has had a previous total hysterectomy. colonoscopy [facility] 4/08 diverticular dx  Reviewed, no changes. FH:  Noncontributory  Reviewed, no changes. SH: Patient is . Personal Habits: Current cigarette smoker, has smoked 1/2 pack daily. NOW VAPOR Drinks alcohol occasionally. Does not exercise. Reviewed, no changes. Date: 04/22/2019  Was the patient queried about smoking behavior? Yes   Does the patient currently smoke? Smoking: Patient is a current smoker, smokes every day - (11/9/2015) VAPOR. Objective  Vitals:    03/10/22 1415   BP: 100/60   Pulse: 96   Temp: 97.8 °F (36.6 °C)   TempSrc: Temporal   SpO2: (!) 89%   Weight: 141 lb (64 kg)       Exam:  Const: Appears healthy, Appears frail. Eyes: EOMI in both eyes. PERRL. ENMT: External canals are clear and dry. Tympanic membranes are intact. External nose WNL. Neck: Supple and symmetric. Palpation reveals no adenopathy. No masses appreciated. Thyroid exhibits no nodules  or thyromegaly. No JVD. Slight soft enlargement at the top of the sternum. Questionable underlying bony enlargement. Resp: Respirations are unlabored. Respiration rate is normal. Auscultate markedly decreased airflow. Slight wheeze anteriorly right side but cleared with cough. No rhonchi or or rales are detected. CV: Rhythm is regular. S1 is normal. S2 is normal. No heart murmur appreciated. Extremities: No clubbing, cyanosis or edema. Musculo: Walks with a normal gait for age. Upper Extremities: Crepitation and limitation with movement of the upper  extremities bilaterally. Lower Extremities: Crepitation and limitation of the lower extremities. Skin: Skin is warm and dry. Neuro: Alert and oriented x3. Mood is normal. Affect is normal. Speech is articulate and fluent. Reflexes: DTR's are  intact, symmetric and 2+ bilaterally. Psych: Patient's attitude is cooperative. Patient's affect is appropriate. Judgement is realistic. Insight is appropriate. Janneth Davenport was seen today for hypertension and anxiety. Diagnoses and all orders for this visit:    Reactive depression    Centrilobular emphysema (HCC)  -     albuterol sulfate HFA (VENTOLIN HFA) 108 (90 Base) MCG/ACT inhaler; Inhale 2 puffs into the lungs every 4-6 hours as needed for Wheezing or Shortness of Breath    Menopause  -     estradiol (CLIMARA) 0.05 MG/24HR; PLACE 1 PATCH ONTO THE SKIN ONCE WEEKLY    Essential hypertension  -     metoprolol succinate (TOPROL XL) 50 MG extended release tablet; 1 tab daily    Other orders  -     citalopram (CELEXA) 20 MG tablet; Take 1 tablet by mouth daily  -     ipratropium-albuterol (DUONEB) 0.5-2.5 (3) MG/3ML SOLN nebulizer solution; Inhale 3 mLs into the lungs every 6 hours as needed for Shortness of Breath  -     pantoprazole (PROTONIX) 40 MG tablet; Take 1 tablet by mouth daily  -     ADVAIR DISKUS 500-50 MCG/DOSE diskus inhaler; Inhale 1 puff into the lungs every 12 hours  -     tiotropium (SPIRIVA HANDIHALER) 18 MCG inhalation capsule;  Inhale 1 capsule into the lungs daily  - predniSONE (DELTASONE) 20 MG tablet; Take 3 tablets by mouth daily for 10 days    Summer Vargas was seen today for hypertension and anxiety. Diagnoses and all orders for this visit:    Reactive depression    Centrilobular emphysema (HCC)  -     albuterol sulfate HFA (VENTOLIN HFA) 108 (90 Base) MCG/ACT inhaler; Inhale 2 puffs into the lungs every 4-6 hours as needed for Wheezing or Shortness of Breath    Menopause  -     estradiol (CLIMARA) 0.05 MG/24HR; PLACE 1 PATCH ONTO THE SKIN ONCE WEEKLY    Essential hypertension  -     metoprolol succinate (TOPROL XL) 50 MG extended release tablet; 1 tab daily    Other orders  -     citalopram (CELEXA) 20 MG tablet; Take 1 tablet by mouth daily  -     ipratropium-albuterol (DUONEB) 0.5-2.5 (3) MG/3ML SOLN nebulizer solution; Inhale 3 mLs into the lungs every 6 hours as needed for Shortness of Breath  -     pantoprazole (PROTONIX) 40 MG tablet; Take 1 tablet by mouth daily  -     ADVAIR DISKUS 500-50 MCG/DOSE diskus inhaler; Inhale 1 puff into the lungs every 12 hours  -     tiotropium (SPIRIVA HANDIHALER) 18 MCG inhalation capsule; Inhale 1 capsule into the lungs daily  -     predniSONE (DELTASONE) 20 MG tablet; Take 3 tablets by mouth daily for 10 days    The patient will need imaging on her return visit. She will be treated with oral steroids for the next 10 days. Advair will be increased. Spiriva will be introduced. Hopefully this will allow her to oxygenate with less oxygen demand. Currently she is using 4 L nasal cannula.

## 2022-03-10 NOTE — TELEPHONE ENCOUNTER
----- Message from Graeme Peralesjuanita sent at 3/10/2022  9:21 AM EST -----  Subject: Message to Provider    QUESTIONS  Information for Provider? Patient is confirming appointment location.  ---------------------------------------------------------------------------  --------------  CALL BACK INFO  What is the best way for the office to contact you? Do not leave any   message, patient will call back for answer  Preferred Call Back Phone Number?  [phone]  ---------------------------------------------------------------------------  --------------  SCRIPT ANSWERS  undefined

## 2022-03-17 ENCOUNTER — CARE COORDINATION (OUTPATIENT)
Dept: CARE COORDINATION | Age: 82
End: 2022-03-17

## 2022-03-18 NOTE — CARE COORDINATION
[facility] contacted Marisabel Cabello to offer enrollment in Care Coordination. Care coordination program explained to Marisabel Cabello. She is agreeable to enrollment. Marisabel Cabello began to review her medication list with [facility] and the call was dropped. [facility] returned call to Marisabel Cabello and reached voice mail. HIPAA compliant message left. Attempted to reach daughter Andreas Haas) by telephone. Left HIPAA compliant message requesting a return call. Will attempt to reach patient/family again.

## 2022-03-25 ENCOUNTER — CARE COORDINATION (OUTPATIENT)
Dept: CARE COORDINATION | Age: 82
End: 2022-03-25

## 2022-03-25 DIAGNOSIS — F51.01 PRIMARY INSOMNIA: ICD-10-CM

## 2022-03-25 DIAGNOSIS — M15.9 PRIMARY OSTEOARTHRITIS INVOLVING MULTIPLE JOINTS: Primary | ICD-10-CM

## 2022-03-25 RX ORDER — TRAMADOL HYDROCHLORIDE 50 MG/1
50 TABLET ORAL EVERY 6 HOURS PRN
Qty: 30 TABLET | Refills: 0 | Status: SHIPPED
Start: 2022-03-25 | End: 2022-04-05 | Stop reason: SDUPTHER

## 2022-03-25 RX ORDER — ALPRAZOLAM 0.25 MG/1
0.25 TABLET ORAL NIGHTLY PRN
Qty: 30 TABLET | Refills: 5 | Status: SHIPPED
Start: 2022-03-25 | End: 2022-09-13

## 2022-03-25 NOTE — TELEPHONE ENCOUNTER
Last Appointment:  3/10/2022  Future Appointments   Date Time Provider Emily Gamez   4/12/2022 12:00 PM Candice Ramos  [facility]

## 2022-03-25 NOTE — LETTER
3/25/2022    Willie Li  [address] [place] [id number]      Dear Willie Li,    My name is Meg Christie RN and I am a registered nurse who partners with Herminio Rene MD to improve patients' health. Herminio Rene MD believes you would benefit from working with me. As a member of your health care team, I would work with other providers involved in your care, offer education for your specific health conditions, and connect you with additional resources as needed. I will collaborate with Herminio Rene MD to support you in following your treatment plan. The additional support I provide is no additional cost to you. My primary focus is to help you achieve specific goals and improve your health. We are committed to walk with you on this journey and look forward to working with you. Please call me to further discuss your healthcare needs. I am available by phone or for appointments at the office. You can reach me at [phone].     In good health,     Meg Christie RN

## 2022-03-25 NOTE — CARE COORDINATION
Attempted to reach patient by telephone. Left HIPAA compliant message requesting a return call. Due to unable to reach patient via phone will also send introduction letter via mail. Will attempt to reach patient again.

## 2022-04-04 ENCOUNTER — CARE COORDINATION (OUTPATIENT)
Dept: CARE COORDINATION | Age: 82
End: 2022-04-04

## 2022-04-04 ENCOUNTER — OFFICE VISIT (OUTPATIENT)
Dept: FAMILY MEDICINE CLINIC | Age: 82
End: 2022-04-04
Payer: MEDICARE

## 2022-04-04 ENCOUNTER — TELEPHONE (OUTPATIENT)
Dept: PRIMARY CARE CLINIC | Age: 82
End: 2022-04-04

## 2022-04-04 VITALS
HEIGHT: 64 IN | RESPIRATION RATE: 18 BRPM | WEIGHT: 141 LBS | BODY MASS INDEX: 24.07 KG/M2 | OXYGEN SATURATION: 94 % | HEART RATE: 91 BPM | TEMPERATURE: 98.4 F

## 2022-04-04 DIAGNOSIS — J20.9 BRONCHITIS WITH BRONCHOSPASM: Primary | ICD-10-CM

## 2022-04-04 DIAGNOSIS — J44.1 COPD WITH ACUTE EXACERBATION (HCC): ICD-10-CM

## 2022-04-04 PROCEDURE — 99214 OFFICE O/P EST MOD 30 MIN: CPT | Performed by: PHYSICIAN ASSISTANT

## 2022-04-04 RX ORDER — DOXYCYCLINE HYCLATE 100 MG
100 TABLET ORAL 2 TIMES DAILY
Qty: 20 TABLET | Refills: 0 | Status: SHIPPED | OUTPATIENT
Start: 2022-04-04 | End: 2022-04-14

## 2022-04-04 RX ORDER — PREDNISONE 20 MG/1
20 TABLET ORAL 2 TIMES DAILY
Qty: 10 TABLET | Refills: 0 | Status: SHIPPED | OUTPATIENT
Start: 2022-04-04 | End: 2022-04-09

## 2022-04-04 SDOH — ECONOMIC STABILITY: HOUSING INSECURITY
IN THE LAST 12 MONTHS, WAS THERE A TIME WHEN YOU DID NOT HAVE A STEADY PLACE TO SLEEP OR SLEPT IN A SHELTER (INCLUDING NOW)?: NO

## 2022-04-04 SDOH — ECONOMIC STABILITY: HOUSING INSECURITY: IN THE LAST 12 MONTHS, HOW MANY PLACES HAVE YOU LIVED?: 1

## 2022-04-04 SDOH — ECONOMIC STABILITY: INCOME INSECURITY: IN THE LAST 12 MONTHS, WAS THERE A TIME WHEN YOU WERE NOT ABLE TO PAY THE MORTGAGE OR RENT ON TIME?: NO

## 2022-04-04 NOTE — PROGRESS NOTES
Chief Complaint       Cough (x 1 week, productive with green sputum) and Shortness of Breath (hx of COPD)      History of Present Illness   Source of history provided by:  patient. Natalia Alejandro is a 79 y.o. old female presenting to the walk in clinic for evaluation of a productive cough with green sputum and intermittent shortness of breath for the past week. Patient does have a history of COPD which is typically controlled with daily Advair and Spiriva as well as as needed albuterol. Patient is also on 4 L of continuous oxygen at home. She states that her oxygen has remained stable at 94% throughout her acute illness. Denies any fever, chills, loss of taste or smell, CP, dyspnea, LE edema, abdominal pain, vomiting, rash, or lethargy. Patient denies recent sick exposures. Patient has been vaccinated for COVID-19. ROS    Unless otherwise stated in this report or unable to obtain because of the patient's clinical or mental status as evidenced by the medical record, this patients's positive and negative responses for Review of Systems, constitutional, psych, eyes, ENT, cardiovascular, respiratory, gastrointestinal, neurological, genitourinary, musculoskeletal, integument systems and systems related to the presenting problem are either stated in the preceding or were not pertinent or were negative for the symptoms and/or complaints related to the medical problem. Past Medical History:  has a past medical history of Breast cyst, COPD (chronic obstructive pulmonary disease) ([address] Hypertension, and Tobacco abuse. Past Surgical History:  has a past surgical history that includes knee surgery; Hysterectomy, total abdominal; and Colonoscopy (04/2008). Social History:  reports that she quit smoking about 3 years ago. Her smoking use included cigarettes. She has a 30.00 pack-year smoking history. She has never used smokeless tobacco. She reports that she does not drink alcohol.   Family History: family history is not on file. Allergies: Penicillins, Venlafaxine, and Adhesive tape    Physical Exam         VS:  Pulse 91   Temp 98.4 °F (36.9 °C) (Temporal)   Resp 18   Ht 5' 4\" (1.626 m)   Wt 141 lb (64 kg)   SpO2 94% Comment: on 4L  BMI 24.20 kg/m²    Oxygen Saturation Interpretation: Normal.    Constitutional:  Alert, development consistent with age. NAD. Head:  NC/NT. Airway patent. Mouth: Posterior pharynx with mild erythema and clear postnasal drip. No tonsillar hypertrophy or exudate. Neck:  Normal ROM. Supple. No anterior cervical adenopathy noted. Lungs: Breath sounds are diminished throughout. Faint end expiratory wheezes appreciated over the bilateral lung bases. No rales or rhonchi. Patient is breathing comfortably on exam without any signs of respiratory distress noted. CV:  Regular rate and rhythm, normal heart sounds, without pathological murmurs, ectopy, gallops, or rubs. Skin:  Normal turgor. Warm, dry, without visible rash. Lymphatic: No lymphangitis or adenopathy noted. Neurological:  Oriented. Motor functions intact. Lab / Imaging Results   (All laboratory and radiology results have been personally reviewed by myself)  Labs:  No results found for this visit on 04/04/22. Imaging: All Radiology results interpreted by Radiologist unless otherwise noted. Assessment / Plan     Impression(s):  Kiet Skinner was seen today for cough and shortness of breath. Diagnoses and all orders for this visit:    Bronchitis with bronchospasm  -     doxycycline hyclate (VIBRA-TABS) 100 MG tablet; Take 1 tablet by mouth 2 times daily for 10 days  -     predniSONE (DELTASONE) 20 MG tablet; Take 1 tablet by mouth 2 times daily for 5 days    COPD with acute exacerbation (HCC)  -     doxycycline hyclate (VIBRA-TABS) 100 MG tablet; Take 1 tablet by mouth 2 times daily for 10 days  -     predniSONE (DELTASONE) 20 MG tablet;  Take 1 tablet by mouth 2 times daily for 5 days      Disposition:  Disposition: Discharge to home. Symptoms are most consistent with acute bronchitis and COPD exacerbation. Prescriptions written for doxycycline and a prednisone burst, side effects discussed. Increase fluids and rest.  Additional symptomatic relief discussed including Tylenol prn pain/fever. Continue all other medications as prescribed. Schedule f/u with PCP in 7-10 days if symptoms persist. ED sooner if symptoms worsen or change. ED immediately with high or refractory fever, progressive SOB, increased oxygen needs, dyspnea, CP, calf pain/swelling, shaking chills, vomiting, abdominal pain, lethargy, flank pain, or decreased urinary output. Pt verbalizes understanding and is in agreement with plan of care. All questions answered. Kanwal Butler PA-C    **This report was transcribed using voice recognition software. Every effort was made to ensure accuracy; however, inadvertent computerized transcription errors may be present.

## 2022-04-05 ENCOUNTER — TELEPHONE (OUTPATIENT)
Dept: FAMILY MEDICINE CLINIC | Age: 82
End: 2022-04-05

## 2022-04-05 DIAGNOSIS — M15.9 PRIMARY OSTEOARTHRITIS INVOLVING MULTIPLE JOINTS: ICD-10-CM

## 2022-04-05 RX ORDER — TRAMADOL HYDROCHLORIDE 50 MG/1
50 TABLET ORAL EVERY 6 HOURS PRN
Qty: 90 TABLET | Refills: 2 | Status: SHIPPED | OUTPATIENT
Start: 2022-04-05 | End: 2022-05-05

## 2022-04-05 SDOH — ECONOMIC STABILITY: FOOD INSECURITY: WITHIN THE PAST 12 MONTHS, YOU WORRIED THAT YOUR FOOD WOULD RUN OUT BEFORE YOU GOT MONEY TO BUY MORE.: NEVER TRUE

## 2022-04-05 SDOH — ECONOMIC STABILITY: TRANSPORTATION INSECURITY
IN THE PAST 12 MONTHS, HAS THE LACK OF TRANSPORTATION KEPT YOU FROM MEDICAL APPOINTMENTS OR FROM GETTING MEDICATIONS?: NO

## 2022-04-05 SDOH — ECONOMIC STABILITY: FOOD INSECURITY: WITHIN THE PAST 12 MONTHS, THE FOOD YOU BOUGHT JUST DIDN'T LAST AND YOU DIDN'T HAVE MONEY TO GET MORE.: NEVER TRUE

## 2022-04-05 SDOH — ECONOMIC STABILITY: TRANSPORTATION INSECURITY
IN THE PAST 12 MONTHS, HAS LACK OF TRANSPORTATION KEPT YOU FROM MEETINGS, WORK, OR FROM GETTING THINGS NEEDED FOR DAILY LIVING?: NO

## 2022-04-05 ASSESSMENT — LIFESTYLE VARIABLES: HOW OFTEN DO YOU HAVE A DRINK CONTAINING ALCOHOL: NEVER

## 2022-04-05 ASSESSMENT — SOCIAL DETERMINANTS OF HEALTH (SDOH): HOW HARD IS IT FOR YOU TO PAY FOR THE VERY BASICS LIKE FOOD, HOUSING, MEDICAL CARE, AND HEATING?: NOT HARD AT ALL

## 2022-04-05 ASSESSMENT — ENCOUNTER SYMPTOMS: DYSPNEA ASSOCIATED WITH: MINIMAL EXERTION

## 2022-04-05 NOTE — TELEPHONE ENCOUNTER
I am not sure what happened with this. I did go ahead and give her 80 and sent the prescription to the pharmacy.

## 2022-04-05 NOTE — TELEPHONE ENCOUNTER
Andre Gomez - Daughter  [phone]       She is calling about the script for Tramadol. This was was written for 30 tablets when she usually gets 90. Was this the intended amount for her?

## 2022-04-05 NOTE — CARE COORDINATION
Ambulatory Care Coordination Note  4/5/2022  CM Risk Score: 2  Charlson 10 Year Mortality Risk Score: 79%     ACC: Elizabeth Strange RN    Summary Note:  ACM contacted Zulema Vazquez. She states she lives independently in a two level home (main floor plus basement). Her daughter and son in law live next door. They provide transportation as needed. Zulema Vazquez states she wears oxygen continuously at 4 liters via nasal cannula. She checks her oxygen level routinely and the last reading was 92%. She was recently seen at walk in clinic due to increased cough, increased sputum production, and increased shortness of breath. She was given prescription for COPD exacerbation and has started to take them. She states her cough and sputum production has slightly lessened. ACM reviewed the signs and symptoms of worsening COPD and when to contact provider. Zulema Vazquez verbalized understanding. Alda reviewed her medication list with ACM and it was updated. Future appointments were reviewed. Plan  Send welcome letter, COPD zone tool, right place/right care, and blood pressure literature via mail  Check status of COPD  Continue to follow for care coordination               Ambulatory Care Coordination Assessment    Care Coordination Protocol  Program Enrollment: Rising Risk  Referral from Primary Care Provider: No  Week 1 - Initial Assessment     Do you have all of your prescriptions and are they filled?: Yes  Barriers to medication adherence: None  Are you able to afford your medications?: No  How often do you have trouble taking your medications the way you have been told to take them?: I always take them as prescribed. Do you have Home O2 Therapy?: Yes   Oxygen Regimen: Continuous Flow - Enter rate/FIO2: 4   Method of Delivery: Nasal Cannula   CPAP Use: None      Ability to seek help/take action for Emergent Urgent situations i.e. fire, crime, inclement weather or health crisis. : Independent  Ability to ambulate to restroom: Independent  Ability handle personal hygeine needs (bathing/dressing/grooming): Independent  Ability to manage Medications: Independent  Ability to prepare Food Preparation: Independent  Ability to maintain home (clean home, laundry): Independent  Ability to drive and/or has transportation: Needs Assistance  Ability to do shopping: Independent  Ability to manage finances: Independent  Is patient able to live independently?: Yes     Current Housing: Private Residence        Per the Fall Risk Screening, did the patient have 2 or more falls or 1 fall with injury in the past year?: No     Frequent urination at night?: No  Do you use rails/bars?: Yes  Do you have a non-slip tub mat?: No     Are you experiencing loss of meaning?: No  Are you experiencing loss of hope and peace?: No     Thinking about your patient's physical health needs, are there any symptoms or problems (risk indicators) you are unsure about that require further investigation?: No identified areas of uncertainly or problems already being investigated   Are the patients physical health problems impacting on their mental well-being?: No identified areas of concern   Are there any problems with your patients lifestyle behaviors (alcohol, drugs, diet, exercise) that are impacting on physical or mental well-being?: No identified areas of concern   Do you have any other concerns about your patients mental well-being?  How would you rate their severity and impact on the patient?: No identified areas of concern   How would you rate their home environment in terms of safety and stability (including domestic violence, insecure housing, neighbor harassment)?: Consistently safe, supportive, stable, no identified problems   How do daily activities impact on the patient's well-being? (include current or anticipated unemployment, work, caregiving, access to transportation or other): No identified problems or perceived positive benefits   How would you rate their social network (family, work, friends)?: Good participation with social networks   How would you rate their financial resources (including ability to afford all required medical care)?: Financially secure, some resource challenges   How wells does the patient now understand their health and well-being (symptoms, signs or risk factors) and what they need to do to manage their health?: Reasonable to good understanding and already engages in managing health or is willing to undertake better management   How well do you think your patient can engage in healthcare discussions? (Barriers include language, deafness, aphasia, alcohol or drug problems, learning difficulties, concentration): Clear and open communication, no identified barriers   Suggested Interventions and Community Resources  Fall Risk Prevention: Completed Zone Management Tools: Completed         Set up/Review an Education Plan, Set up/Review Goals              Prior to Admission medications    Medication Sig Start Date End Date Taking? Authorizing Provider   doxycycline hyclate (VIBRA-TABS) 100 MG tablet Take 1 tablet by mouth 2 times daily for 10 days 4/4/22 4/14/22 Yes Kalyani Butler PA-C   predniSONE (DELTASONE) 20 MG tablet Take 1 tablet by mouth 2 times daily for 5 days 4/4/22 4/9/22 Yes Kalyani Butler PA-C   traMADol (ULTRAM) 50 MG tablet Take 1 tablet by mouth every 6 hours as needed for Pain for up to 30 days. 3/25/22 4/24/22 Yes Christiano Ponce MD   ALPRAZolam Becca Gupta) 0.25 MG tablet Take 1 tablet by mouth nightly as needed for Sleep for up to 30 days.  3/25/22 4/24/22 Yes Christiano Ponce MD   albuterol sulfate HFA (VENTOLIN HFA) 108 (90 Base) MCG/ACT inhaler Inhale 2 puffs into the lungs every 4-6 hours as needed for Wheezing or Shortness of Breath 3/10/22 4/9/22 Yes Christiano Ponce MD   citalopram (CELEXA) 20 MG tablet Take 1 tablet by mouth daily 3/10/22  Yes Christiano Ponce MD   estradiol (CLIMARA) 0.05 MG/24HR PLACE 1 PATCH ONTO THE SKIN ONCE WEEKLY 3/10/22  Yes Abdiaziz Prado MD   ipratropium-albuterol (DUONEB) 0.5-2.5 (3) MG/3ML SOLN nebulizer solution Inhale 3 mLs into the lungs every 6 hours as needed for Shortness of Breath 3/10/22  Yes Abdiaziz Prado MD   metoprolol succinate (TOPROL XL) 50 MG extended release tablet 1 tab daily 3/10/22  Yes Abdiaziz Prado MD   pantoprazole (PROTONIX) 40 MG tablet Take 1 tablet by mouth daily 3/10/22  Yes Abdiaziz Prado MD   ADVAIR DISKUS 500-50 MCG/DOSE diskus inhaler Inhale 1 puff into the lungs every 12 hours 3/10/22  Yes Abdiaziz Prado MD   tiotropium (SPIRIVA HANDIHALER) 18 MCG inhalation capsule Inhale 1 capsule into the lungs daily 3/10/22  Yes Abdiaziz Prado MD   Probiotic Product [facility]) CAPS TAKE 1 CAPSULE BY MOUTH TWICE DAILY 1/20/22  Yes Historical Provider, MD   famotidine (PEPCID) 20 MG tablet Take 20 mg by mouth daily 1/20/22  Yes Historical Provider, MD   calcium carbonate (TUMS) 500 MG chewable tablet Take 500 mg by mouth   Yes Historical Provider, MD   oxyCODONE-acetaminophen (PERCOCET) 5-325 MG per tablet TAKE 1 TABLET BY MOUTH EVERY 8 HOURS AS NEEDED FOR PAIN FOR 5 DAYS  Patient not taking: Reported on 4/4/2022 12/10/21   Historical Provider, MD   calcium carbonate (OYSTER SHELL CALCIUM 500 MG) 1250 (500 Ca) MG tablet Take 500 mg by mouth  Patient not taking: Reported on 4/4/2022    Historical Provider, MD       Future Appointments   Date Time Provider Emily Gamez   4/12/2022 12:00 PM MD RC Xiao [facility]      and   COPD Assessment    Does the patient understand envrionmental exposure?: Yes  Does the patient have a nebulizer?: Yes  Does the patient use a space with inhaled medications?: No     Shortness of breath (worse than baseline)         Symptoms:  COPD associated increased fatigue: Pos      Symptom course: worsening  Breathlessness: minimal exertion  Increase use of rapid acting/rescue inhaled medications?: Yes  Change in chronic cough?: Increased  Change in sputum?: Increased  Sputum characteristics: Green  Self Monitoring - SaO2: Yes  Baseline SaO2 Readin  Have you had a recent diagnosis of pneumonia either by PCP or at a hospital?: No

## 2022-04-12 ENCOUNTER — CARE COORDINATION (OUTPATIENT)
Dept: CARE COORDINATION | Age: 82
End: 2022-04-12

## 2022-04-15 ENCOUNTER — OFFICE VISIT (OUTPATIENT)
Dept: FAMILY MEDICINE CLINIC | Age: 82
End: 2022-04-15
Payer: MEDICARE

## 2022-04-15 VITALS
HEIGHT: 64 IN | OXYGEN SATURATION: 94 % | RESPIRATION RATE: 18 BRPM | TEMPERATURE: 97.4 F | SYSTOLIC BLOOD PRESSURE: 128 MMHG | DIASTOLIC BLOOD PRESSURE: 80 MMHG | HEART RATE: 93 BPM | BODY MASS INDEX: 24.07 KG/M2 | WEIGHT: 141 LBS

## 2022-04-15 DIAGNOSIS — H69.82 ACUTE DYSFUNCTION OF LEFT EUSTACHIAN TUBE: Primary | ICD-10-CM

## 2022-04-15 PROCEDURE — 99212 OFFICE O/P EST SF 10 MIN: CPT | Performed by: NURSE PRACTITIONER

## 2022-04-15 RX ORDER — PREDNISONE 10 MG/1
TABLET ORAL
Qty: 30 TABLET | Refills: 0 | Status: SHIPPED | OUTPATIENT
Start: 2022-04-15 | End: 2022-04-27

## 2022-04-15 NOTE — PROGRESS NOTES
Subjective:  Chief Complaint   Patient presents with    Otalgia     x 2-3 days    Congestion       HPI:  The patient states that they have had a cough, runny nose and nasal congestion for the last 3 days. Cough is productive of sputum. The patient also reports mild sore throat without pain with swallowing/difficulty swallowing. Denies fever or chills but states felt warm. Was on a 10-day course of doxycycline that she finished within the last few days, as well as a 5-day course of prednisone that she finished last week. Patient denies CP or dyspnea above her normal baseline. No vomiting or diarrhea. Patient has been taking OTC medications without improvement. The patient presents for evaluation. ROS:  Positive and pertinent negatives as per HPI. All other systems are reviewed and negative. Current Outpatient Medications:     predniSONE (DELTASONE) 10 MG tablet, Take 4 tablets by mouth daily for 3 days, THEN 3 tablets daily for 3 days, THEN 2 tablets daily for 3 days, THEN 1 tablet daily for 3 days. , Disp: 30 tablet, Rfl: 0    traMADol (ULTRAM) 50 MG tablet, Take 1 tablet by mouth every 6 hours as needed for Pain for up to 30 days. , Disp: 90 tablet, Rfl: 2    ALPRAZolam (XANAX) 0.25 MG tablet, Take 1 tablet by mouth nightly as needed for Sleep for up to 30 days. , Disp: 30 tablet, Rfl: 5    citalopram (CELEXA) 20 MG tablet, Take 1 tablet by mouth daily, Disp: 90 tablet, Rfl: 1    estradiol (CLIMARA) 0.05 MG/24HR, PLACE 1 PATCH ONTO THE SKIN ONCE WEEKLY, Disp: 4 patch, Rfl: 5    ipratropium-albuterol (DUONEB) 0.5-2.5 (3) MG/3ML SOLN nebulizer solution, Inhale 3 mLs into the lungs every 6 hours as needed for Shortness of Breath, Disp: 360 mL, Rfl: 5    metoprolol succinate (TOPROL XL) 50 MG extended release tablet, 1 tab daily, Disp: 90 tablet, Rfl: 1    pantoprazole (PROTONIX) 40 MG tablet, Take 1 tablet by mouth daily, Disp: 30 tablet, Rfl: 5    ADVAIR DISKUS 500-50 MCG/DOSE diskus inhaler, Inhale 1 puff into the lungs every 12 hours, Disp: 60 each, Rfl: 3    tiotropium (SPIRIVA HANDIHALER) 18 MCG inhalation capsule, Inhale 1 capsule into the lungs daily, Disp: 30 capsule, Rfl: 5    Probiotic Product (FLORAJEN3) CAPS, TAKE 1 CAPSULE BY MOUTH TWICE DAILY, Disp: , Rfl:     famotidine (PEPCID) 20 MG tablet, Take 20 mg by mouth daily, Disp: , Rfl:     calcium carbonate (TUMS) 500 MG chewable tablet, Take 500 mg by mouth, Disp: , Rfl:     oxyCODONE-acetaminophen (PERCOCET) 5-325 MG per tablet, TAKE 1 TABLET BY MOUTH EVERY 8 HOURS AS NEEDED FOR PAIN FOR 5 DAYS (Patient not taking: Reported on 4/4/2022), Disp: , Rfl:     albuterol sulfate HFA (VENTOLIN HFA) 108 (90 Base) MCG/ACT inhaler, Inhale 2 puffs into the lungs every 4-6 hours as needed for Wheezing or Shortness of Breath, Disp: 1 each, Rfl: 3    calcium carbonate (OYSTER SHELL CALCIUM 500 MG) 1250 (500 Ca) MG tablet, Take 500 mg by mouth (Patient not taking: Reported on 4/4/2022), Disp: , Rfl:    Allergies   Allergen Reactions    Penicillins     Venlafaxine     Adhesive Tape Rash        Objective:  Vitals:    04/15/22 1103   BP: 128/80   Pulse: 93   Resp: 18   Temp: 97.4 °F (36.3 °C)   TempSrc: Temporal   SpO2: 94%  Comment: 5L   Weight: 141 lb (64 kg)   Height: 5' 4\" (1.626 m)        Exam:  Const: Appears healthy and well developed. No signs of acute distress present. Vitals reviewed per triage. Head/Face: Normocephalic, atraumatic. Facies is symmetric. Eyes: PERRL. ENMT: Tympanic membranes are pearly gray, loss of light reflex of the left TM. Nares are patent with clear rhinorrhea. Buccal mucosa is moist. Mild erythema in the posterior pharynx without edema of oropharynx or petechiae of palate. Neck: Supple and symmetric. Palpation reveals no adenopathy. No meningeal signs. Trachea midline. Resp: Decreased breath sounds throughout without any adventitious sounds today.   CV: S1 is normal. S2 is normal.  Musculo: Patient moves extremities without pain or limitation. Pulses are equal bilaterally. Skin: Skin is warm and dry. Neuro: Alert and oriented x3. Speech is articulate and fluent. Psych: Mood and affect are appropriate to situation. I am going to hold off on antibiotics since she just finished it and these do seem to be new symptoms associated with eustachian tube dysfunction. Any signs of worsening the patient would need to be seen once more. Maria Ines Elio was seen today for otalgia and congestion. Diagnoses and all orders for this visit:    Acute dysfunction of left eustachian tube  -     predniSONE (DELTASONE) 10 MG tablet; Take 4 tablets by mouth daily for 3 days, THEN 3 tablets daily for 3 days, THEN 2 tablets daily for 3 days, THEN 1 tablet daily for 3 days.            Seen By:    MIKE Bingham - CNP

## 2022-04-19 ENCOUNTER — CARE COORDINATION (OUTPATIENT)
Dept: CARE COORDINATION | Age: 82
End: 2022-04-19

## 2022-04-20 ASSESSMENT — ENCOUNTER SYMPTOMS: DYSPNEA ASSOCIATED WITH: MINIMAL EXERTION

## 2022-04-20 NOTE — CARE COORDINATION
Ambulatory Care Coordination Note  4/20/2022  CM Risk Score: 2  Charlson 10 Year Mortality Risk Score: 79%     ACC: Jd Albright, RN    Summary Note: [facility] contacted [facility]. She states she has been having difficulty breathing for the past day and a half. She states she did not sleep last night because she was afraid to and called her daughter (lives next door) to sit with her. She reports increased frequency of cough and sputum production. She states she is taking her prednisone and is using her inhalers and nebulizer. Her pox reading on 4L via nasal cannula is 90%. She states nothing has helped relieve her symptoms. Per [facility], she has just been sitting in the chair and moving as little as possible due to shortness of breath. She states she intends to see \"what happens tonight\". [facility] advised Paola to go to the emergency room. She initially declined but with further discussion of her symptoms and no relief from interventions she is agreeable. [facility] offered to call ambulance for [facility] and she declines. She states her daughter is able to take her and she will call her. Call was ended. [facility] returned call to [facility]. She states her daughter is on her way and will take her to [facility].    Plan  COPD status? Update PCP  Continue to follow for care coordination      Care Coordination Interventions    Program Enrollment: Rising Risk  Referral from Primary Care Provider: No  Suggested Interventions and Community Resources  Fall Risk Prevention: Completed  Zone Management Tools: Completed (Comment: COPD Zone tool)         Goals Addressed                 This Visit's Progress     Conditions and Symptoms   On track     I will schedule office visits, as directed by my provider. I will keep my appointment or reschedule if I have to cancel. I will notify my provider of any barriers to my plan of care. I will follow my Zone Management tool to seek urgent or emergent care.   I will notify my provider of any symptoms that indicate a worsening of my condition. Barriers: overwhelmed by complexity of regimen and stress  Plan for overcoming my barriers: COPD zone tool and Care Coordination  Confidence: 8/10  Anticipated Goal Completion Date: 6/17/2022         Reduce Falls    On track     I will reduce my risk of falls by the following: Remove rugs or use non slip rugs  Install grab bars in bathroom  Use walking aids like cane or walker    Barriers: overwhelmed by complexity of regimen and stress  Plan for overcoming my barriers: Fall prevention literature and care coordination  Confidence: 8/10  Anticipated Goal Completion Date: 6/17/2022            Prior to Admission medications    Medication Sig Start Date End Date Taking? Authorizing Provider   predniSONE (DELTASONE) 10 MG tablet Take 4 tablets by mouth daily for 3 days, THEN 3 tablets daily for 3 days, THEN 2 tablets daily for 3 days, THEN 1 tablet daily for 3 days. 4/15/22 4/27/22  MIKE Elizalde - JAKE   traMADol (ULTRAM) 50 MG tablet Take 1 tablet by mouth every 6 hours as needed for Pain for up to 30 days. 4/5/22 5/5/22  Willis Callejas MD   ALPRAZolam [facility]) 0.25 MG tablet Take 1 tablet by mouth nightly as needed for Sleep for up to 30 days.  3/25/22 4/24/22  Willis Callejas MD   oxyCODONE-acetaminophen (PERCOCET) 5-325 MG per tablet TAKE 1 TABLET BY MOUTH EVERY 8 HOURS AS NEEDED FOR PAIN FOR 5 DAYS  Patient not taking: Reported on 4/4/2022 12/10/21   Historical Provider, MD   albuterol sulfate HFA (VENTOLIN HFA) 108 (90 Base) MCG/ACT inhaler Inhale 2 puffs into the lungs every 4-6 hours as needed for Wheezing or Shortness of Breath 3/10/22 4/9/22  Willis Callejas MD   citalopram (CELEXA) 20 MG tablet Take 1 tablet by mouth daily 3/10/22   Willis Callejas MD   estradiol (CLIMARA) 0.05 MG/24HR PLACE 1 PATCH ONTO THE SKIN ONCE WEEKLY 3/10/22   Willis Callejas MD   ipratropium-albuterol (DUONEB) 0.5-2.5 (3) MG/3ML SOLN nebulizer solution Inhale 3 mLs into the lungs every 6 hours as needed for Shortness of Breath 3/10/22   Maria Alejandra Rodriguez MD   metoprolol succinate (TOPROL XL) 50 MG extended release tablet 1 tab daily 3/10/22   Maria Alejandra Rodriguez MD   pantoprazole (PROTONIX) 40 MG tablet Take 1 tablet by mouth daily 3/10/22   Maria Alejandra Rodriguez MD   ADVAIR DISKUS 500-50 MCG/DOSE diskus inhaler Inhale 1 puff into the lungs every 12 hours 3/10/22   Maria Alejandra Rodriguez MD   tiotropium (Andrea Apgar) 18 MCG inhalation capsule Inhale 1 capsule into the lungs daily 3/10/22   Maria Alejandra Rodriguez MD   Probiotic Product [facility]) CAPS TAKE 1 CAPSULE BY MOUTH TWICE DAILY 22   Historical Provider, MD   famotidine (PEPCID) 20 MG tablet Take 20 mg by mouth daily 22   Historical Provider, MD   calcium carbonate (OYSTER SHELL CALCIUM 500 MG) 1250 (500 Ca) MG tablet Take 500 mg by mouth  Patient not taking: Reported on 2022    Historical Provider, MD   calcium carbonate (TUMS) 500 MG chewable tablet Take 500 mg by mouth    Historical Provider, MD       Future Appointments   Date Time Provider Emily Gamez   2022 11:45 AM Maria Alejandra Rodriguez MD [facility]      and   COPD Assessment    Does the patient understand envrionmental exposure?: Yes  Does the patient have a nebulizer?: Yes  Does the patient use a space with inhaled medications?: No     Shortness of breath (worse than baseline), Increase in cough         Symptoms:  COPD associated increased fatigue: Pos      Symptom course: worsening  Breathlessness: minimal exertion  Increase use of rapid acting/rescue inhaled medications?: Yes  Change in chronic cough?: Increased  Change in sputum?: No/At Baseline  Self Monitoring - SaO2: Yes  Baseline SaO2 Readin  Have you had a recent diagnosis of pneumonia either by PCP or at a hospital?: No

## 2022-05-02 ENCOUNTER — CARE COORDINATION (OUTPATIENT)
Dept: CARE COORDINATION | Age: 82
End: 2022-05-02

## 2022-05-02 NOTE — LETTER
5/2/2022    [address]     I have enjoyed working with you to improve your health. I would like to continue to provide you support. However, I have been unable to reach you at, [phone] (home) . Please let me know if I can help schedule an office visit for you or answer any questions. Ana Maria Slater MD is interested in your health and hopes to hear from you soon. If you would like continued access to your Nurse Care Coordinator, Kristen Marques RN, you can reach me at [phone]. I will wait to hear from you and will no longer reach out to you. Ana Maria Slater MD and his/her team will continue to provide care and be available for questions.       In good health,     Kristen Marques RN

## 2022-05-02 NOTE — CARE COORDINATION
Third attempt to reach patient by telephone. Left HIPAA compliant message requesting a return call. Will send unable to reach letter by mail. [facility] will remove patient from care coordination. Care coordination can be restarted if patient returns call to [facility].

## 2022-05-06 ENCOUNTER — CARE COORDINATION (OUTPATIENT)
Dept: CARE COORDINATION | Age: 82
End: 2022-05-06

## 2022-05-12 ENCOUNTER — OFFICE VISIT (OUTPATIENT)
Dept: FAMILY MEDICINE CLINIC | Age: 82
End: 2022-05-12
Payer: MEDICARE

## 2022-05-12 VITALS
BODY MASS INDEX: 23.9 KG/M2 | HEIGHT: 64 IN | OXYGEN SATURATION: 97 % | RESPIRATION RATE: 24 BRPM | WEIGHT: 140 LBS | TEMPERATURE: 97.7 F | HEART RATE: 68 BPM

## 2022-05-12 DIAGNOSIS — J44.1 COPD WITH ACUTE EXACERBATION (HCC): ICD-10-CM

## 2022-05-12 DIAGNOSIS — J98.01 ACUTE BRONCHOSPASM: Primary | ICD-10-CM

## 2022-05-12 PROCEDURE — 99214 OFFICE O/P EST MOD 30 MIN: CPT | Performed by: PHYSICIAN ASSISTANT

## 2022-05-12 RX ORDER — BUDESONIDE 1 MG/2ML
INHALANT ORAL
COMMUNITY
Start: 2022-04-22

## 2022-05-12 RX ORDER — TRAMADOL HYDROCHLORIDE 50 MG/1
TABLET ORAL
COMMUNITY
Start: 2022-05-05 | End: 2022-06-27 | Stop reason: SDUPTHER

## 2022-05-12 RX ORDER — PREDNISONE 20 MG/1
20 TABLET ORAL 2 TIMES DAILY
Qty: 10 TABLET | Refills: 0 | Status: SHIPPED | OUTPATIENT
Start: 2022-05-12 | End: 2022-05-17

## 2022-05-12 NOTE — PROGRESS NOTES
Chief Complaint       Shortness of Breath (since last night)      History of Present Illness   Source of history provided by: patient. Neal Jefferson is a 79 y.o. old female presenting to the walk in clinic for evaluation of intermittent shortness of breath, chest congestion, and wheezing since last night. Patient does have a history of severe COPD and is on continuous oxygen at home for this (4L). Patient is also taking daily Advair and Spiriva as well as as needed albuterol. She does admit to having to use her rescue inhaler more often over the past 24 hours. Denies any fever, chills, loss of taste or smell, cough, CP, dyspnea, LE edema, abdominal pain, vomiting, rash, or lethargy. Patient denies recent sick exposures. Patient has been vaccinated for COVID-19. Of note, patient states that she recently recovered from pneumonia last month. ROS    Unless otherwise stated in this report or unable to obtain because of the patient's clinical or mental status as evidenced by the medical record, this patients's positive and negative responses for Review of Systems, constitutional, psych, eyes, ENT, cardiovascular, respiratory, gastrointestinal, neurological, genitourinary, musculoskeletal, integument systems and systems related to the presenting problem are either stated in the preceding or were not pertinent or were negative for the symptoms and/or complaints related to the medical problem. Past Medical History:  has a past medical history of Breast cyst, COPD (chronic obstructive pulmonary disease) ([facility] [address] Hypertension, and Tobacco abuse. Past Surgical History:  has a past surgical history that includes knee surgery; Hysterectomy, total abdominal; and Colonoscopy (04/2008). Social History:  reports that she quit smoking about 3 years ago. Her smoking use included cigarettes. She has a 30.00 pack-year smoking history.  She has never used smokeless tobacco. She reports that she does not drink alcohol. Family History: family history is not on file. Allergies: Penicillins, Venlafaxine, and Adhesive tape    Physical Exam         VS:  Pulse 68   Temp 97.7 °F (36.5 °C) (Temporal)   Resp 24   Ht 5' 4\" (1.626 m)   Wt 140 lb (63.5 kg)   SpO2 97% Comment: 4 L  BMI 24.03 kg/m²    Oxygen Saturation Interpretation: Normal.    Constitutional:  Alert, development consistent with age. NAD. Head:  NC/NT. Airway patent. Mouth: Posterior pharynx with mild erythema and clear postnasal drip. No tonsillar hypertrophy or exudate. Neck:  Normal ROM. Supple. No anterior cervical adenopathy noted. Lungs: Coarse rhonchi and end expiratory wheezes appreciated throughout the lung fields. No rales appreciated. Patient is breathing comfortably on exam without any signs of respiratory distress noted. CV:  Regular rate and rhythm, normal heart sounds, without pathological murmurs, ectopy, gallops, or rubs. Skin:  Normal turgor. Warm, dry, without visible rash. Lymphatic: No lymphangitis or adenopathy noted. Neurological:  Oriented. Motor functions intact. Lab / Imaging Results   (All laboratory and radiology results have been personally reviewed by myself)  Labs:  No results found for this visit on 05/12/22. Imaging: All Radiology results interpreted by Radiologist unless otherwise noted. Assessment / Plan     Impression(s):  Jana Menjivar was seen today for shortness of breath. Diagnoses and all orders for this visit:    Acute bronchospasm  -     XR CHEST STANDARD (2 VW); Future  -     predniSONE (DELTASONE) 20 MG tablet; Take 1 tablet by mouth 2 times daily for 5 days    COPD with acute exacerbation (HCC)  -     XR CHEST STANDARD (2 VW); Future  -     predniSONE (DELTASONE) 20 MG tablet; Take 1 tablet by mouth 2 times daily for 5 days      Disposition:  Disposition: Discharge to home. Symptoms are most likely representative of an acute COPD exacerbation given recent change in weather.   For acute symptomatic relief, prescription written for a prednisone burst, side effects discussed. To rule out the presence of any acute process including pneumonia, order given for outpatient chest x-ray, will call with results once available. Continue to increase fluids and rest.  Additional symptomatic relief discussed. Patient also advised to continue all inhalers at home as prescribed. Schedule f/u with PCP in 7-10 days if symptoms persist. ED sooner if symptoms worsen or change. ED immediately with high or refractory fever, progressive SOB, dyspnea, CP, calf pain/swelling, shaking chills, vomiting, abdominal pain, lethargy, flank pain, or decreased urinary output. Pt verbalizes understanding and is in agreement with plan of care. All questions answered. Ruby Butler PA-C    **This report was transcribed using voice recognition software. Every effort was made to ensure accuracy; however, inadvertent computerized transcription errors may be present.

## 2022-05-17 NOTE — CARE COORDINATION
Ambulatory Care Coordination Note  5/17/2022  CM Risk Score: 2  Charlson 10 Year Mortality Risk Score: 79%     ACC: Aryan Abbott RN    Summary Note: ANTONINO received call from Dhaliwal in response to recently mailed letter. She states she screens her calls and had not recognized AC's phone number on previous attempts to contact her. ACDK asked if she could save AC's number in her phone contacts and she is agreeable. She states she is wearing her oxygen continuously at 4 L via nasal cannula. She denies increase in cough or change in sputum production. She is using her maintenance inhaler as directed and also taking duoneb treatments. COPD zone tool was discussed with Isra. She states she has received educational information mailed by [employer]. Isra states she has all her medications filled and takes them as prescribed. Plan  Follow for COPD  Continue to follow for care coordination      Goals Addressed                 This Visit's Progress     Conditions and Symptoms   No change     I will schedule office visits, as directed by my provider. I will keep my appointment or reschedule if I have to cancel. I will notify my provider of any barriers to my plan of care. I will follow my Zone Management tool to seek urgent or emergent care. I will notify my provider of any symptoms that indicate a worsening of my condition.     Barriers: overwhelmed by complexity of regimen and stress  Plan for overcoming my barriers: COPD zone tool and Care Coordination  Confidence: 8/10  Anticipated Goal Completion Date: 6/17/2022         Reduce Falls    On track     I will reduce my risk of falls by the following: Remove rugs or use non slip rugs  Install grab bars in bathroom  Use walking aids like cane or walker    Barriers: overwhelmed by complexity of regimen and stress  Plan for overcoming my barriers: Fall prevention literature and care coordination  Confidence: 8/10  Anticipated Goal Completion Date: 6/17/2022 Prior to Admission medications    Medication Sig Start Date End Date Taking? Authorizing Provider   budesonide (PULMICORT) 1 MG/2ML nebulizer suspension USE 1 VIAL IN NEBULIZER EVERY 12 HOURS 4/22/22   Historical Provider, MD   traMADol (ULTRAM) 50 MG tablet take 1 tablet by mouth every 6 hours if needed 5/5/22   Historical Provider, MD   predniSONE (DELTASONE) 20 MG tablet Take 1 tablet by mouth 2 times daily for 5 days 5/12/22 5/17/22  Kalyani Butler PA-C   oxyCODONE-acetaminophen (PERCOCET) 5-325 MG per tablet TAKE 1 TABLET BY MOUTH EVERY 8 HOURS AS NEEDED FOR PAIN FOR 5 DAYS 12/10/21   Historical Provider, MD   albuterol sulfate HFA (VENTOLIN HFA) 108 (90 Base) MCG/ACT inhaler Inhale 2 puffs into the lungs every 4-6 hours as needed for Wheezing or Shortness of Breath 3/10/22 4/9/22  Ko Chisholm MD   citalopram (CELEXA) 20 MG tablet Take 1 tablet by mouth daily 3/10/22   Ko Chisholm MD   estradiol (CLIMARA) 0.05 MG/24HR PLACE 1 PATCH ONTO THE SKIN ONCE WEEKLY 3/10/22   Ko Chisholm MD   ipratropium-albuterol (DUONEB) 0.5-2.5 (3) MG/3ML SOLN nebulizer solution Inhale 3 mLs into the lungs every 6 hours as needed for Shortness of Breath 3/10/22   Ko Chisholm MD   metoprolol succinate (TOPROL XL) 50 MG extended release tablet 1 tab daily 3/10/22   Ko Chisholm MD   pantoprazole (PROTONIX) 40 MG tablet Take 1 tablet by mouth daily 3/10/22   Ko Chisholm MD   ADVAIR DISKUS 500-50 MCG/DOSE diskus inhaler Inhale 1 puff into the lungs every 12 hours 3/10/22   Ko Chisholm MD   tiotropium (SPIRIVA HANDIHALER) 18 MCG inhalation capsule Inhale 1 capsule into the lungs daily 3/10/22   Ko Chisholm MD   Probiotic Product [facility]) CAPS TAKE 1 CAPSULE BY MOUTH TWICE DAILY 1/20/22   Historical Provider, MD   famotidine (PEPCID) 20 MG tablet Take 20 mg by mouth daily 1/20/22   Historical Provider, MD   calcium carbonate (OYSTER SHELL CALCIUM 500 MG) 1250 (500 Ca) MG tablet Take 500 mg by mouth Patient not taking: Reported on 5/12/2022    Historical Provider, MD   calcium carbonate (TUMS) 500 MG chewable tablet Take 500 mg by mouth    Historical Provider, MD       Future Appointments   Date Time Provider Emily Gamez   5/24/2022  2:30 PM MIKE Thorne - CNP [facility]   8/1/2022 11:45 AM Yamileth Castro MD [facility]      and   COPD Assessment    Does the patient understand envrionmental exposure?: Yes  Does the patient have a nebulizer?: Yes  Does the patient use a space with inhaled medications?: No     No patient-reported symptoms         Symptoms:     Have you had a recent diagnosis of pneumonia either by PCP or at a hospital?:  (Comment: Yes at walk in)

## 2022-05-23 ENCOUNTER — CARE COORDINATION (OUTPATIENT)
Dept: CARE COORDINATION | Age: 82
End: 2022-05-23

## 2022-05-26 RX ORDER — FLUTICASONE PROPIONATE AND SALMETEROL 500; 50 UG/1; UG/1
1 POWDER RESPIRATORY (INHALATION) EVERY 12 HOURS
Qty: 60 EACH | Refills: 3 | Status: CANCELLED | OUTPATIENT
Start: 2022-05-26

## 2022-05-26 NOTE — TELEPHONE ENCOUNTER
Last Appointment:  3/10/2022  Future Appointments   Date Time Provider Emily Solomoni   6/28/2022  2:00 PM SCHEDULE, [facility]   6/28/2022  3:15 PM MIKE Costa - CNP [facility]   8/1/2022 11:45 AM Catracho Wallace MD [facility]

## 2022-05-27 RX ORDER — FLUTICASONE PROPIONATE AND SALMETEROL XINAFOATE 230; 21 UG/1; UG/1
2 AEROSOL, METERED RESPIRATORY (INHALATION) 2 TIMES DAILY
Qty: 12 G | Refills: 5
Start: 2022-05-27 | End: 2022-06-08 | Stop reason: SDUPTHER

## 2022-06-01 ENCOUNTER — OFFICE VISIT (OUTPATIENT)
Dept: FAMILY MEDICINE CLINIC | Age: 82
End: 2022-06-01
Payer: MEDICARE

## 2022-06-01 ENCOUNTER — TELEPHONE (OUTPATIENT)
Dept: PRIMARY CARE CLINIC | Age: 82
End: 2022-06-01

## 2022-06-01 VITALS
RESPIRATION RATE: 24 BRPM | HEIGHT: 64 IN | HEART RATE: 119 BPM | SYSTOLIC BLOOD PRESSURE: 144 MMHG | OXYGEN SATURATION: 92 % | DIASTOLIC BLOOD PRESSURE: 80 MMHG | BODY MASS INDEX: 22.88 KG/M2 | WEIGHT: 134 LBS | TEMPERATURE: 97.8 F

## 2022-06-01 DIAGNOSIS — R09.02 HYPOXIA: ICD-10-CM

## 2022-06-01 DIAGNOSIS — J44.1 COPD EXACERBATION (HCC): Primary | ICD-10-CM

## 2022-06-01 DIAGNOSIS — R06.03 RESPIRATORY DISTRESS: ICD-10-CM

## 2022-06-01 LAB
Lab: NORMAL
PERFORMING INSTRUMENT: NORMAL
QC PASS/FAIL: NORMAL
SARS-COV-2, POC: NORMAL

## 2022-06-01 PROCEDURE — 87426 SARSCOV CORONAVIRUS AG IA: CPT | Performed by: NURSE PRACTITIONER

## 2022-06-01 PROCEDURE — 99215 OFFICE O/P EST HI 40 MIN: CPT | Performed by: NURSE PRACTITIONER

## 2022-06-01 PROCEDURE — 1123F ACP DISCUSS/DSCN MKR DOCD: CPT | Performed by: NURSE PRACTITIONER

## 2022-06-01 NOTE — TELEPHONE ENCOUNTER
This patient has horrible lung disease. Even the smallest amount of infection can be a problem. She needs assessed in person either in express or the ER.

## 2022-06-01 NOTE — TELEPHONE ENCOUNTER
Daughter called in, Pt having some difficulty breathing, stated \"it sounds more wet\". She was wondering if you would call something in. Advised to come to express care or go to the ER.

## 2022-06-01 NOTE — PROGRESS NOTES
Chief Complaint:       Shortness of Breath    History of Present Illness   Source of history provided by:  patient. Zack Ramsey is a 79 y.o. old female who presents to walk-in with complaints of shortness of breath which began several days ago. Reports associated cough and rhinorrhea. Since onset the symptoms have been worsened. Pt denies any CP, vomiting, abdominal pain, neck stiffness, or lethargy. At home treatment has been unsuccessful. Currently on 6L Nasal cannula. Normally wears 4L at home. Family at home all sick with colds. Review of Systems   Unless otherwise stated in this report or unable to obtain because of the patient's clinical or mental status as evidenced by the medical record, this patients's positive and negative responses for Review of Systems, constitutional, psych, eyes, ENT, cardiovascular, respiratory, gastrointestinal, neurological, genitourinary, musculoskeletal, integument systems and systems related to the presenting problem are either stated in the preceding or were not pertinent or were negative for the symptoms and/or complaints related to the medical problem. Past Medical History:  has a past medical history of Breast cyst, COPD (chronic obstructive pulmonary disease) ([address] Hypertension, and Tobacco abuse. Past Surgical History:  has a past surgical history that includes knee surgery; Hysterectomy, total abdominal; and Colonoscopy (04/2008). Social History:  reports that she quit smoking about 3 years ago. Her smoking use included cigarettes. She has a 30.00 pack-year smoking history. She has never used smokeless tobacco. She reports that she does not drink alcohol. Family History: family history is not on file.   Allergies: Penicillins, Venlafaxine, and Adhesive tape    Physical Exam   Vital Signs:  BP (!) 144/80   Pulse (!) 119   Temp 97.8 °F (36.6 °C)   Resp 24   Ht 5' 4\" (1.626 m)   Wt 134 lb (60.8 kg)   SpO2 92% Comment: 4L at rest  BMI 23.00 kg/m²    Oxygen Saturation Interpretation: Abnormal.    General Appearance/Constitutional:  Alert, In respiratory distress. HEENT:  NC/NT. PERRLA. Airway patent. Mild posterior pharyngeal erythema without edema. Neck:  Normal ROM. Supple. No adenopathy. Lungs: Inspiratory and expiratory breath sounds with no wheezing, rales or rhonchi. There is no prolonged expiratory phase. Heart:  Regular rate and rhythm, normal heart sounds, without pathological murmurs, ectopy, gallops, or rubs. .  Abdomen:  Soft, nontender, good bowel sounds. No firm or pulsatile mass. Skin:  Normal turgor. Warm, dry, without visible rash. Extremities:  No clubbing, cyanosis, or edema bilaterally. Neurological:  Oriented x3. Motor functions intact. Test Results Section   (All laboratory and radiology results have been personally reviewed by myself)  Labs:  Results for orders placed or performed in visit on 06/01/22   POCT COVID-19, Antigen   Result Value Ref Range    SARS-COV-2, POC Not-Detected Not Detected    Lot Number [id number]     QC Pass/Fail pass     Performing Instrument BD Veritor      Imaging: All Radiology results interpreted by Radiologist unless otherwise noted. Assessment / Plan   Impression(s):  Ihsan Radford was seen today for shortness of breath. Diagnoses and all orders for this visit:    COPD exacerbation ([address]  -     POCT COVID-19, Antigen    Hypoxia    Respiratory distress    Pt advised that she needs a comprehensive pulmonary workup including STAT labs and imaging that is unable to be performed in an urgent care setting. EMS called for patient due to respiratory distress and hypoxia. Pts SpO2 with ambulation was at 84% on 6L NC. At rest she did increase to 92% at her normal 4L. Pt left our office in stable condition. Further disposition to follow. All questions answered.      Electronically signed by MIKE Gunter CNP   DD: 6/1/22    **This report was transcribed using voice recognition software. Every effort was made to ensure accuracy; however, inadvertent computerized transcription errors may be present.

## 2022-06-07 ENCOUNTER — CARE COORDINATION (OUTPATIENT)
Dept: CARE COORDINATION | Age: 82
End: 2022-06-07

## 2022-06-08 RX ORDER — FLUTICASONE PROPIONATE AND SALMETEROL XINAFOATE 230; 21 UG/1; UG/1
2 AEROSOL, METERED RESPIRATORY (INHALATION) 2 TIMES DAILY
Qty: 12 G | Refills: 5 | Status: SHIPPED | OUTPATIENT
Start: 2022-06-08

## 2022-06-08 NOTE — TELEPHONE ENCOUNTER
Last Appointment:  3/10/2022  Future Appointments   Date Time Provider Emily Solomoni   6/28/2022  2:00 PM SCHEDULE, [facility]   6/28/2022  3:15 PM MIKE Suarez - CNP [facility]   8/1/2022 11:45 AM Sarah Yee MD [facility]

## 2022-06-27 ENCOUNTER — CARE COORDINATION (OUTPATIENT)
Dept: CARE COORDINATION | Age: 82
End: 2022-06-27

## 2022-06-27 DIAGNOSIS — M15.9 PRIMARY OSTEOARTHRITIS INVOLVING MULTIPLE JOINTS: Primary | ICD-10-CM

## 2022-06-27 RX ORDER — TRAMADOL HYDROCHLORIDE 50 MG/1
50 TABLET ORAL EVERY 6 HOURS PRN
Qty: 120 TABLET | Refills: 2 | Status: SHIPPED | OUTPATIENT
Start: 2022-06-27 | End: 2022-07-27

## 2022-06-27 NOTE — TELEPHONE ENCOUNTER
Last Appointment:  3/10/2022  Future Appointments   Date Time Provider Emily Solomoni   6/28/2022  2:00 PM SCHEDULE, [facility]   6/28/2022  3:15 PM MIKE Bullock - NP [facility]   8/1/2022 11:45 AM Catracho Wallace MD [facility]

## 2022-06-30 DIAGNOSIS — J43.2 CENTRILOBULAR EMPHYSEMA (HCC): ICD-10-CM

## 2022-06-30 RX ORDER — ALBUTEROL SULFATE 90 UG/1
2 AEROSOL, METERED RESPIRATORY (INHALATION)
Qty: 1 EACH | Refills: 3 | Status: SHIPPED
Start: 2022-06-30 | End: 2022-09-19

## 2022-07-01 RX ORDER — IPRATROPIUM BROMIDE AND ALBUTEROL SULFATE 2.5; .5 MG/3ML; MG/3ML
1 SOLUTION RESPIRATORY (INHALATION) EVERY 6 HOURS PRN
Qty: 360 ML | Refills: 5 | Status: SHIPPED | OUTPATIENT
Start: 2022-07-01

## 2022-07-01 NOTE — TELEPHONE ENCOUNTER
Last Appointment:  3/10/2022  Future Appointments   Date Time Provider Emily Vera   7/27/2022  2:30 PM MIKE Whitfield - NP [facility]   8/1/2022 11:45 AM Riana Lord MD [facility]

## 2022-07-06 ENCOUNTER — CARE COORDINATION (OUTPATIENT)
Dept: CARE COORDINATION | Age: 82
End: 2022-07-06

## 2022-07-06 ASSESSMENT — ENCOUNTER SYMPTOMS: DYSPNEA ASSOCIATED WITH: MINIMAL EXERTION

## 2022-07-06 NOTE — CARE COORDINATION
Ambulatory Care Coordination Note  7/6/2022  CM Risk Score: 2  Charlson 10 Year Mortality Risk Score: 79%     ACC: Edmond Coleman RN    Summary Note: [facility] contacted Paola. She has attended an appointment with pulmonology. AC reviewed medications with Paola. Paola is unclear which medications she should be taking. She states she has been using her albuterol inhaler approximately twice daily and her nebulizer 2-3 times daily. She has completed the medication prescribed by pulm for COPD exacerbation. She states she is doing \"much better\". She is not using her maintenance inhaler. Her daughters are present at her home. ACM spoke with her daughters. They state Paola is very confused which inhalers she is to take. [facility] reviewed last note from pulmonology. ACM will contact pulmonologist's office to clarify medications. Paola requests the medication information be shared with her daughters. Plan  Contact pulm office- spoke with Veronica Starr. She will clarify medications with provider and call patient's daughter Inder Inches)  Check copd status  Continue to follow for care coordination           Lab Results     None              Goals Addressed                 This Visit's Progress     Conditions and Symptoms   On track     I will schedule office visits, as directed by my provider. I will keep my appointment or reschedule if I have to cancel. I will notify my provider of any barriers to my plan of care. I will follow my Zone Management tool to seek urgent or emergent care. I will notify my provider of any symptoms that indicate a worsening of my condition.     Barriers: overwhelmed by complexity of regimen and stress  Plan for overcoming my barriers: COPD zone tool and Care Coordination  Confidence: 8/10  Anticipated Goal Completion Date: 6/17/2022         Reduce Falls    On track     I will reduce my risk of falls by the following: Remove rugs or use non slip rugs  Install grab bars in bathroom  Use walking aids like cane or walker    Barriers: overwhelmed by complexity of regimen and stress  Plan for overcoming my barriers: Fall prevention literature and care coordination  Confidence: 8/10  Anticipated Goal Completion Date: 6/17/2022            Prior to Admission medications    Medication Sig Start Date End Date Taking? Authorizing Provider   ipratropium-albuterol (DUONEB) 0.5-2.5 (3) MG/3ML SOLN nebulizer solution Inhale 3 mLs into the lungs every 6 hours as needed for Shortness of Breath 7/1/22   Claudia Kulkarni MD   albuterol sulfate HFA (VENTOLIN HFA) 108 (90 Base) MCG/ACT inhaler Inhale 2 puffs into the lungs every 4-6 hours as needed for Wheezing or Shortness of Breath 6/30/22 7/30/22  Claudia Kulkarni MD   predniSONE (DELTASONE) 10 MG tablet Take 4 tablets by mouth daily for 3 days, THEN 3 tablets daily for 3 days, THEN 2 tablets daily for 3 days, THEN 1 tablet daily for 3 days. 6/28/22 7/10/22  MIKE Becker - NP   traMADol (ULTRAM) 50 MG tablet Take 1 tablet by mouth every 6 hours as needed for Pain for up to 30 days.  6/27/22 7/27/22  Claudia Kulkarni MD   ADVAIR -21 MCG/ACT inhaler Inhale 2 puffs into the lungs 2 times daily 6/8/22   Claudia Kulkarni MD   budesonide (PULMICORT) 1 MG/2ML nebulizer suspension USE 1 VIAL IN NEBULIZER EVERY 12 HOURS  Patient not taking: Reported on 5/24/2022 4/22/22   Historical Provider, MD   oxyCODONE-acetaminophen (PERCOCET) 5-325 MG per tablet TAKE 1 TABLET BY MOUTH EVERY 8 HOURS AS NEEDED FOR PAIN FOR 5 DAYS  Patient not taking: Reported on 6/28/2022 12/10/21   Historical Provider, MD   citalopram (CELEXA) 20 MG tablet Take 1 tablet by mouth daily 3/10/22   Claudia Kulkarni MD   estradiol (CLIMARA) 0.05 MG/24HR PLACE 1 PATCH ONTO THE SKIN ONCE WEEKLY 3/10/22   Claudia Kulkarni MD   metoprolol succinate (TOPROL XL) 50 MG extended release tablet 1 tab daily 3/10/22   Claudia Kulkarni MD   pantoprazole (PROTONIX) 40 MG tablet Take 1 tablet by mouth daily  Patient not taking: Reported on 2022 3/10/22   Berto Mahoney MD   tiotropium (Farhat Crisp) 18 MCG inhalation capsule Inhale 1 capsule into the lungs daily 3/10/22   Berto Mahoney MD   Probiotic Product [facility]) CAPS TAKE 1 CAPSULE BY MOUTH TWICE DAILY 22   Historical Provider, MD   famotidine (PEPCID) 20 MG tablet Take 20 mg by mouth daily  Patient not taking: Reported on 2022   Historical Provider, MD   calcium carbonate (OYSTER SHELL CALCIUM 500 MG) 1250 (500 Ca) MG tablet Take 500 mg by mouth   Patient not taking: Reported on 2022    Historical Provider, MD   calcium carbonate (TUMS) 500 MG chewable tablet Take 500 mg by mouth    Historical Provider, MD       Future Appointments   Date Time Provider Emily Gamez   2022  2:30 PM MIKE Chou - NP [facility]   2022 11:45 AM Berto Mahoney MD [facility]      and   COPD Assessment    Does the patient understand envrionmental exposure?: Yes  Does the patient have a nebulizer?: Yes  Does the patient use a space with inhaled medications?: No     No patient-reported symptoms         Symptoms:  None: Yes      Symptom course: improving  Breathlessness: minimal exertion  Increase use of rapid acting/rescue inhaled medications?: Yes  Change in chronic cough?: No/At Baseline  Change in sputum?: No/At Baseline  Sputum characteristics: Yellow  Self Monitoring - SaO2: Yes (Comment: intermittently)  Baseline SaO2 Readin

## 2022-08-01 ENCOUNTER — OFFICE VISIT (OUTPATIENT)
Dept: PRIMARY CARE CLINIC | Age: 82
End: 2022-08-01
Payer: MEDICARE

## 2022-08-01 VITALS
HEART RATE: 71 BPM | TEMPERATURE: 97.5 F | SYSTOLIC BLOOD PRESSURE: 115 MMHG | BODY MASS INDEX: 24.37 KG/M2 | OXYGEN SATURATION: 87 % | DIASTOLIC BLOOD PRESSURE: 70 MMHG | WEIGHT: 142 LBS

## 2022-08-01 DIAGNOSIS — D64.9 ANEMIA, UNSPECIFIED TYPE: ICD-10-CM

## 2022-08-01 DIAGNOSIS — M19.90 ARTHRITIS: ICD-10-CM

## 2022-08-01 DIAGNOSIS — J43.2 CENTRILOBULAR EMPHYSEMA (HCC): Primary | ICD-10-CM

## 2022-08-01 DIAGNOSIS — I10 ESSENTIAL HYPERTENSION: ICD-10-CM

## 2022-08-01 PROCEDURE — 99214 OFFICE O/P EST MOD 30 MIN: CPT | Performed by: INTERNAL MEDICINE

## 2022-08-01 PROCEDURE — 1123F ACP DISCUSS/DSCN MKR DOCD: CPT | Performed by: INTERNAL MEDICINE

## 2022-08-01 RX ORDER — MORPHINE SULFATE 15 MG/1
15 TABLET ORAL EVERY 6 HOURS PRN
Qty: 15 TABLET | Refills: 0 | Status: SHIPPED | OUTPATIENT
Start: 2022-08-01 | End: 2022-08-31

## 2022-08-01 RX ORDER — TRAMADOL HYDROCHLORIDE 50 MG/1
TABLET ORAL
COMMUNITY
Start: 2022-07-30

## 2022-08-01 RX ORDER — ALPRAZOLAM 0.5 MG/1
0.5 TABLET ORAL NIGHTLY PRN
COMMUNITY

## 2022-08-01 RX ORDER — PREDNISONE 20 MG/1
40 TABLET ORAL DAILY
Qty: 20 TABLET | Refills: 0 | Status: SHIPPED | OUTPATIENT
Start: 2022-08-01 | End: 2022-08-11

## 2022-08-01 ASSESSMENT — PATIENT HEALTH QUESTIONNAIRE - PHQ9
3. TROUBLE FALLING OR STAYING ASLEEP: 0
SUM OF ALL RESPONSES TO PHQ QUESTIONS 1-9: 1
2. FEELING DOWN, DEPRESSED OR HOPELESS: 0
4. FEELING TIRED OR HAVING LITTLE ENERGY: 1
SUM OF ALL RESPONSES TO PHQ QUESTIONS 1-9: 1
10. IF YOU CHECKED OFF ANY PROBLEMS, HOW DIFFICULT HAVE THESE PROBLEMS MADE IT FOR YOU TO DO YOUR WORK, TAKE CARE OF THINGS AT HOME, OR GET ALONG WITH OTHER PEOPLE: 0
SUM OF ALL RESPONSES TO PHQ9 QUESTIONS 1 & 2: 0
8. MOVING OR SPEAKING SO SLOWLY THAT OTHER PEOPLE COULD HAVE NOTICED. OR THE OPPOSITE, BEING SO FIGETY OR RESTLESS THAT YOU HAVE BEEN MOVING AROUND A LOT MORE THAN USUAL: 0
7. TROUBLE CONCENTRATING ON THINGS, SUCH AS READING THE NEWSPAPER OR WATCHING TELEVISION: 0
6. FEELING BAD ABOUT YOURSELF - OR THAT YOU ARE A FAILURE OR HAVE LET YOURSELF OR YOUR FAMILY DOWN: 0
SUM OF ALL RESPONSES TO PHQ QUESTIONS 1-9: 1
SUM OF ALL RESPONSES TO PHQ QUESTIONS 1-9: 1
9. THOUGHTS THAT YOU WOULD BE BETTER OFF DEAD, OR OF HURTING YOURSELF: 0
1. LITTLE INTEREST OR PLEASURE IN DOING THINGS: 0
5. POOR APPETITE OR OVEREATING: 0

## 2022-08-01 NOTE — PROGRESS NOTES
Patient with severe COPD. Recently saw pulmonary medicine. Several medications were changed. I did review this. Pulmonary function tests are planned. The patient and I did go over DO NOT RESUSCITATE orders. The patient does wish resuscitation and does wish to be intubated if necessary. Patient did have x-ray in the spring but then subsequently had x-ray in early June. She did have some infiltrative changes and she was treated with prednisone for exacerbation in early June. She has not had an exacerbation since that time. She does frequently use her rescue inhaler. She was placed on a long-acting steroid and a long-acting beta agonist.  She was then told to use her DuoNeb mini nebs every 4-6 hours as needed. Occasionally the patient does experience severe air hunger. We did discuss the use of morphine sulfate for this and low-dose. I told her it could suppress her respiratory rate but if she was in panic mode that may be quite helpful. Patient states that she is doing better in terms of her depression. Her  did die earlier in the year. In the spring her Celexa was increased. Her weight is actually up a few pounds but I think this is probably more fluid than it is anything else. ROS:  Const: General health stated as fair. Eyes: Denies eye symptoms. CV: Reports hypertension. Resp: Reports COPD, chronic hypoxemia. : Severe hot flushing Denies urinary problems. Musculo: Reports arthralgias and arthritis. Breast: Denies breast problems. Endocrine: Denies polydipsia, polyphagia and polyuria. Current Outpatient Medications   Medication Sig Dispense Refill    traMADol (ULTRAM) 50 MG tablet take 1 tablet by mouth every 6 hours if needed      budesonide (PULMICORT) 0.5 MG/2ML nebulizer suspension Take 2 mLs by nebulization in the morning and 2 mLs before bedtime.  120 mL 5    ipratropium-albuterol (DUONEB) 0.5-2.5 (3) MG/3ML SOLN nebulizer solution Inhale 3 mLs into the lungs every 6 hours as needed for Shortness of Breath 360 mL 5    citalopram (CELEXA) 20 MG tablet Take 1 tablet by mouth daily 90 tablet 1    metoprolol succinate (TOPROL XL) 50 MG extended release tablet 1 tab daily 90 tablet 1    pantoprazole (PROTONIX) 40 MG tablet Take 1 tablet by mouth daily 30 tablet 5    Probiotic Product (FLORAJEN3) CAPS TAKE 1 CAPSULE BY MOUTH TWICE DAILY      calcium carbonate (OYSTER SHELL CALCIUM 500 MG) 1250 (500 Ca) MG tablet Take 500 mg by mouth      calcium carbonate (TUMS) 500 MG chewable tablet Take 500 mg by mouth      ALPRAZolam (XANAX) 0.5 MG tablet Take 0.5 mg by mouth nightly as needed. (Patient not taking: Reported on 8/1/2022)      formoterol (PERFOROMIST) 20 MCG/2ML nebulizer solution Take 2 mLs by nebulization in the morning and 2 mLs in the evening. (Patient not taking: Reported on 8/1/2022) 120 mL 3    formoterol (PERFOROMIST) 20 MCG/2ML nebulizer solution Take 2 mLs by nebulization in the morning and 2 mLs in the evening. (Patient not taking: Reported on 8/1/2022) 120 mL 5    albuterol sulfate HFA (VENTOLIN HFA) 108 (90 Base) MCG/ACT inhaler Inhale 2 puffs into the lungs every 4-6 hours as needed for Wheezing or Shortness of Breath 1 each 3    ADVAIR -21 MCG/ACT inhaler Inhale 2 puffs into the lungs 2 times daily (Patient not taking: Reported on 8/1/2022) 12 g 5    budesonide (PULMICORT) 1 MG/2ML nebulizer suspension USE 1 VIAL IN NEBULIZER EVERY 12 HOURS (Patient not taking: No sig reported)      tiotropium (SPIRIVA HANDIHALER) 18 MCG inhalation capsule Inhale 1 capsule into the lungs daily (Patient not taking: Reported on 8/1/2022) 30 capsule 5     No current facility-administered medications for this visit.        PMH:  Shot Record:  Methp 40-Methylprednisolone Acetate 40 MG 01/12/19  [id number] 80MG NDC [address]-20 03/07/11  -Eisott (Albuterol & Ipratropium) 02/07/19 12/04/18  -Fydl Medrol To 125 MG Injection 02/07/19  -Usxq Medrol 20MG Injection 11/25/13 06/30/04  90732-Pneumococcal Vaccine (Pneumovax) 09/07/11  90688-Influenza Vaccine- Fluzone Iiv4 Quadrivalent Vial, Ages 3 Yrs + 09/19/17  90658-Influenza Vaccine Fluvirin Iiv3 (ages 3 and older) 09/28/15 09/07/11. Health Maintenance:  Mammogram - (12/2/2015)  Bone Density Test Screening - (5/26/2009)  Mammogram Screening - (12/2/2015)  Colonoscopy - (7/20/2017)  Colonoscopy Screening - (7/20/2017)  Colonoscopy - [address] DX, 2008 DIVERTICULAR DX- 2017 - NEXT 2022  PFT'S - 2004, 9/11 MODERATE TO SEVERE COPD  1/2016-MOD/SEVERE COPD  2D ECHO - 2002  Influenza Vaccination - (2016)  Pneumonia Vaccination - (2011)  Prevnar Vaccine - (2014)  Mammogram Screening - (11/15/2017) slip given  She has had pneumonia in the past.  Also, a questionable pulmonary emboli back in 1994, with no recurrence, of an unidentified source. HTN- GOOD RESPONSE TO TOPROL XL  [id number]-0/17/18- DID IMPROVE WITH BRONCODILATOR-REFUSES O2 THERAPY  CHRONIC TOBACCO ABUSE-COUNSELING GIVEN EVERY VISIT  BREAST CYST- NEED REPEAT US IN SPRING 2014  PNEUMONIA 12.2019 - 2.5cm right hilar node  Depression-7/28/2021 increased Celexa. Discussed possible grief counseling. Surgical Hx:  Total Hysterectomy  Knee Replacement - (3/2016)The patient has had a [facility]  Reviewed, no changes. FH:  Noncontributory  Reviewed, no changes. SH: Patient is . Personal Habits: Current cigarette smoker, has smoked 1/2 pack daily. NOW VAPOR Drinks alcohol occasionally. Does not exercise. Reviewed, no changes. Date: 04/22/2019  Was the patient queried about smoking behavior? Yes   Does the patient currently smoke? Smoking: Patient is a current smoker, smokes every day - (11/9/2015) VAPOR.   Objective  Vitals:    08/01/22 1140   BP: 115/70   Pulse: 71   Temp: 97.5 °F (36.4 °C)   TempSrc: Temporal   SpO2: (!) 87%   Weight: 142 lb (64.4 kg)       Exam:  Const: Appears healthy, Appears frail. Eyes: EOMI in both eyes. PERRL. ENMT: External canals are clear and dry. Tympanic membranes are intact. External nose WNL. Neck: Supple and symmetric. Palpation reveals no adenopathy. No masses appreciated. Thyroid exhibits no nodules  or thyromegaly. No JVD. Slight soft enlargement at the top of the sternum. Questionable underlying bony enlargement. Resp: Respirations are unlabored. Respiration rate is normal. Auscultate markedly decreased airflow. Slight wheeze anteriorly right side but cleared with cough. No rhonchi or or rales are detected. CV: Rhythm is regular. S1 is normal. S2 is normal. No heart murmur appreciated. Extremities: No clubbing, cyanosis or edema. Musculo: Walks with a normal gait for age. Upper Extremities: Crepitation and limitation with movement of the upper  extremities bilaterally. Lower Extremities: Crepitation and limitation of the lower extremities. Skin: Skin is warm and dry. Neuro: Alert and oriented x3. Mood is normal. Affect is normal. Speech is articulate and fluent. Reflexes: DTR's are  intact, symmetric and 2+ bilaterally. Psych: Patient's attitude is cooperative. Patient's affect is appropriate. Judgement is realistic. Insight is appropriate. Isra was seen today for hypertension and anxiety. Diagnoses and all orders for this visit:    Centrilobular emphysema ([address]  -     morphine (MSIR) 15 MG tablet; Take 1 tablet by mouth every 6 hours as needed [employer]) for up to 30 days. Essential hypertension    Anemia, unspecified type    Arthritis    Other orders  -     predniSONE (DELTASONE) 20 MG tablet; Take 2 tablets by mouth in the morning for 10 days. As stated above I did discuss whether or not the patient would like to be comfort care. She does wish intubation if necessary. I will see the patient back in 4 months. She does have a follow-up appointment with pulmonary medicine in approximately 2 months.

## 2022-09-06 ENCOUNTER — OFFICE VISIT (OUTPATIENT)
Dept: FAMILY MEDICINE CLINIC | Age: 82
End: 2022-09-06
Payer: MEDICARE

## 2022-09-06 VITALS
SYSTOLIC BLOOD PRESSURE: 142 MMHG | WEIGHT: 143 LBS | DIASTOLIC BLOOD PRESSURE: 86 MMHG | HEART RATE: 102 BPM | BODY MASS INDEX: 24.55 KG/M2 | OXYGEN SATURATION: 90 % | TEMPERATURE: 97.6 F | RESPIRATION RATE: 30 BRPM

## 2022-09-06 DIAGNOSIS — R09.02 HYPOXIA: ICD-10-CM

## 2022-09-06 DIAGNOSIS — R06.02 SHORTNESS OF BREATH: Primary | ICD-10-CM

## 2022-09-06 DIAGNOSIS — R00.0 TACHYCARDIA: ICD-10-CM

## 2022-09-06 LAB
Lab: NORMAL
PERFORMING INSTRUMENT: NORMAL
QC PASS/FAIL: NORMAL
SARS-COV-2, POC: NORMAL

## 2022-09-06 PROCEDURE — 99214 OFFICE O/P EST MOD 30 MIN: CPT | Performed by: PHYSICIAN ASSISTANT

## 2022-09-06 PROCEDURE — 1123F ACP DISCUSS/DSCN MKR DOCD: CPT | Performed by: PHYSICIAN ASSISTANT

## 2022-09-06 PROCEDURE — 87426 SARSCOV CORONAVIRUS AG IA: CPT | Performed by: PHYSICIAN ASSISTANT

## 2022-09-06 ASSESSMENT — ENCOUNTER SYMPTOMS
ABDOMINAL PAIN: 0
BACK PAIN: 0
SHORTNESS OF BREATH: 1
NAUSEA: 1
PHOTOPHOBIA: 0
DIARRHEA: 1
VOMITING: 1
SORE THROAT: 0
COUGH: 0

## 2022-09-06 NOTE — PROGRESS NOTES
22  Shira Mast : 1940 Sex: female  Age 79 y.o. Subjective:  No chief complaint on file. 14-year-old female with a history of hypertension, emphysema, GERD and anxiety presents to the walk-in clinic for evaluation of shortness of breath. Patient was brought back immediately. She states yesterday she had nausea, vomiting and diarrhea. She states today the extreme shortness of breath started. She has COPD and wears 4 L of nasal oxygen at home. She states today she has been extremely short of breath and struggling to breathe. She denies chest pain. She denies cough. No leg swelling. No fever or chills. She states she has had no vomiting or diarrhea today. She is here with her son-in-law. She denies lightheadedness, dizziness or syncope. Review of Systems   Constitutional:  Negative for chills and fever. HENT:  Negative for congestion, ear pain and sore throat. Eyes:  Negative for photophobia and visual disturbance. Respiratory:  Positive for shortness of breath. Negative for cough. Cardiovascular:  Negative for chest pain. Gastrointestinal:  Positive for diarrhea, nausea and vomiting. Negative for abdominal pain. Genitourinary:  Negative for difficulty urinating, dysuria, frequency and urgency. Musculoskeletal:  Negative for back pain, neck pain and neck stiffness. Skin:  Negative for rash. Neurological:  Negative for dizziness, syncope, weakness, light-headedness and headaches. Hematological:  Negative for adenopathy. Does not bruise/bleed easily. Psychiatric/Behavioral:  Negative for agitation and confusion. All other systems reviewed and are negative.       PMH:     Past Medical History:   Diagnosis Date    Breast cyst     COPD (chronic obstructive pulmonary disease) ([facility] [address] 2018    hypoxemia    Hypertension     Tobacco abuse     chronic counseling given every visit        Past Surgical History:   Procedure Laterality Date    COLONOSCOPY 04/2008    HYSTERECTOMY, TOTAL ABDOMINAL (CERVIX REMOVED)      KNEE SURGERY      knee replacement 3-2016       History reviewed. No pertinent family history. Medications:     Current Outpatient Medications:     traMADol (ULTRAM) 50 MG tablet, take 1 tablet by mouth every 6 hours if needed, Disp: , Rfl:     ALPRAZolam (XANAX) 0.5 MG tablet, Take 0.5 mg by mouth nightly as needed. (Patient not taking: Reported on 8/1/2022), Disp: , Rfl:     formoterol (PERFOROMIST) 20 MCG/2ML nebulizer solution, Take 2 mLs by nebulization in the morning and 2 mLs in the evening. (Patient not taking: Reported on 8/1/2022), Disp: 120 mL, Rfl: 3    budesonide (PULMICORT) 0.5 MG/2ML nebulizer suspension, Take 2 mLs by nebulization in the morning and 2 mLs before bedtime. , Disp: 120 mL, Rfl: 5    formoterol (PERFOROMIST) 20 MCG/2ML nebulizer solution, Take 2 mLs by nebulization in the morning and 2 mLs in the evening.  (Patient not taking: Reported on 8/1/2022), Disp: 120 mL, Rfl: 5    ipratropium-albuterol (DUONEB) 0.5-2.5 (3) MG/3ML SOLN nebulizer solution, Inhale 3 mLs into the lungs every 6 hours as needed for Shortness of Breath, Disp: 360 mL, Rfl: 5    albuterol sulfate HFA (VENTOLIN HFA) 108 (90 Base) MCG/ACT inhaler, Inhale 2 puffs into the lungs every 4-6 hours as needed for Wheezing or Shortness of Breath, Disp: 1 each, Rfl: 3    ADVAIR -21 MCG/ACT inhaler, Inhale 2 puffs into the lungs 2 times daily (Patient not taking: Reported on 8/1/2022), Disp: 12 g, Rfl: 5    budesonide (PULMICORT) 1 MG/2ML nebulizer suspension, USE 1 VIAL IN NEBULIZER EVERY 12 HOURS (Patient not taking: No sig reported), Disp: , Rfl:     citalopram (CELEXA) 20 MG tablet, Take 1 tablet by mouth daily, Disp: 90 tablet, Rfl: 1    metoprolol succinate (TOPROL XL) 50 MG extended release tablet, 1 tab daily, Disp: 90 tablet, Rfl: 1    pantoprazole (PROTONIX) 40 MG tablet, Take 1 tablet by mouth daily, Disp: 30 tablet, Rfl: 5    tiotropium (SPIRIVA HANDIHALER) 18 MCG inhalation capsule, Inhale 1 capsule into the lungs daily (Patient not taking: Reported on 8/1/2022), Disp: 30 capsule, Rfl: 5    Probiotic Product (FLORAJEN3) CAPS, TAKE 1 CAPSULE BY MOUTH TWICE DAILY, Disp: , Rfl:     calcium carbonate (OYSTER SHELL CALCIUM 500 MG) 1250 (500 Ca) MG tablet, Take 500 mg by mouth, Disp: , Rfl:     calcium carbonate (TUMS) 500 MG chewable tablet, Take 500 mg by mouth, Disp: , Rfl:     Allergies: Allergies   Allergen Reactions    Penicillins     Venlafaxine     Adhesive Tape Rash       Social History:     Social History     Tobacco Use    Smoking status: Former     Packs/day: 0.50     Years: 60.00     Pack years: 30.00     Types: Cigarettes     Quit date: 12/10/2018     Years since quitting: 3.7    Smokeless tobacco: Never   Vaping Use    Vaping Use: Every day   Substance Use Topics    Alcohol use: No       Patient lives at home. Physical Exam:     Vitals:    09/06/22 1134 09/06/22 1135   BP: (!) 142/86    Pulse: (!) 122 (!) 102   Resp: 30    Temp: 97.6 °F (36.4 °C)    SpO2: (!) 77%  Comment: on 4L 90%  Comment: on 6L nasal cannula   Weight: 143 lb (64.9 kg)        Exam:  Physical Exam  Vitals and nursing note reviewed. Constitutional:       General: She is in acute distress. Appearance: She is well-developed. She is toxic-appearing. HENT:      Head: Normocephalic and atraumatic. Right Ear: Tympanic membrane normal.      Left Ear: Tympanic membrane normal.      Nose: Nose normal.      Mouth/Throat:      Mouth: Mucous membranes are moist.      Pharynx: Oropharynx is clear. Eyes:      Conjunctiva/sclera: Conjunctivae normal.      Pupils: Pupils are equal, round, and reactive to light. Cardiovascular:      Rate and Rhythm: Regular rhythm. Tachycardia present. Pulmonary:      Effort: Tachypnea, accessory muscle usage, respiratory distress and retractions present. Breath sounds: Decreased breath sounds present.    Abdominal:      General: Bowel sounds are normal.      Palpations: Abdomen is soft. Tenderness: There is no abdominal tenderness. Musculoskeletal:         General: Normal range of motion. Cervical back: Normal range of motion. No rigidity. Lymphadenopathy:      Cervical: No cervical adenopathy. Skin:     General: Skin is warm and dry. Neurological:      General: No focal deficit present. Mental Status: She is alert and oriented to person, place, and time. Psychiatric:         Mood and Affect: Mood normal.         Behavior: Behavior normal.         Thought Content: Thought content normal.         Judgment: Judgment normal.         Testing:     No results found for this visit on 09/06/22. Medical Decision Making:       Patient upon arrival did not appear toxic or lethargic. Vital signs were reviewed. Past medical history reviewed. Allergies reviewed. Medications reviewed. Patient is presenting with the above complaint of shortness of breath. When the patient was brought back her pulse ox on her at home 4 L of nasal cannula oxygen was 77%. She was tachycardic at 122. Patient was placed on our oxygen by nasal cannula at 6 L. Patient was unable to complete a full sentence she was so dyspneic upon arrival.  Rapid antigen was obtained for COVID-19 which is negative. Repeat vital signs on 6 L of nasal cannula oxygen reveals 90% with a pulse of 102. Patient appears more comfortable at this time. She understands that she will need emergent evaluation emergency department and we recommend transport by EMS. Patient is agreeable with this plan. EMS was summoned. Patient was stable upon transfer to the emergency department by EMS. Clinical Impression:   Diagnoses and all orders for this visit:    Shortness of breath  -     POCT COVID-19, Antigen    Hypoxia    Tachycardia    Patient transported to ED by EMS. SIGNATURE: Judi Montelongo PA-C

## 2022-09-09 ENCOUNTER — CARE COORDINATION (OUTPATIENT)
Dept: CARE COORDINATION | Age: 82
End: 2022-09-09

## 2022-09-12 DIAGNOSIS — F51.01 PRIMARY INSOMNIA: ICD-10-CM

## 2022-09-13 RX ORDER — ALPRAZOLAM 0.25 MG/1
TABLET ORAL
Qty: 30 TABLET | Refills: 0 | Status: SHIPPED | OUTPATIENT
Start: 2022-09-13 | End: 2022-10-13

## 2022-09-19 ENCOUNTER — CARE COORDINATION (OUTPATIENT)
Dept: CARE COORDINATION | Age: 82
End: 2022-09-19

## 2022-09-19 DIAGNOSIS — J43.2 CENTRILOBULAR EMPHYSEMA (HCC): ICD-10-CM

## 2022-09-19 RX ORDER — ALBUTEROL SULFATE 90 UG/1
AEROSOL, METERED RESPIRATORY (INHALATION)
Qty: 18 G | Refills: 0 | Status: SHIPPED | OUTPATIENT
Start: 2022-09-19

## 2022-09-19 NOTE — CARE COORDINATION
AC received call from Abad Melendez. She states she is experiencing increased shortness of breath. She denies increased sputum production and cough. She states she has increased wheezing. She is unable to use her nebulizer as it is broken. Her daughter Sissy Felipe has called the company and is taking it in for exchange. She is using her albuterol rescue inhaler with some relief. She used her advair this morning. She is able to speak in full sentences throughout conversation with ACM. ACM advised Abad Melendez to go to walk in clinic to be evaluated. She states her daughter will take her. With Alda's permission AC contacted Emily Tuttle. Emily Tuttle states she will transport her mother to walk in [facility] in [facility].

## 2023-10-03 NOTE — CARE COORDINATION
Attempted to reach patient by telephone. Left HIPAA compliant message requesting a return call. Will attempt to reach patient again. [phone]